# Patient Record
Sex: FEMALE | Race: BLACK OR AFRICAN AMERICAN | NOT HISPANIC OR LATINO | Employment: UNEMPLOYED | URBAN - METROPOLITAN AREA
[De-identification: names, ages, dates, MRNs, and addresses within clinical notes are randomized per-mention and may not be internally consistent; named-entity substitution may affect disease eponyms.]

---

## 2017-07-05 VITALS
TEMPERATURE: 98 F | SYSTOLIC BLOOD PRESSURE: 118 MMHG | OXYGEN SATURATION: 100 % | HEART RATE: 81 BPM | WEIGHT: 130 LBS | BODY MASS INDEX: 25.39 KG/M2 | DIASTOLIC BLOOD PRESSURE: 61 MMHG | RESPIRATION RATE: 18 BRPM

## 2017-07-05 PROCEDURE — 99499 UNLISTED E&M SERVICE: CPT | Mod: ,,, | Performed by: EMERGENCY MEDICINE

## 2017-07-05 PROCEDURE — 99900041 HC LEFT WITHOUT BEING SEEN- EMERGENCY

## 2017-07-06 ENCOUNTER — HOSPITAL ENCOUNTER (EMERGENCY)
Facility: HOSPITAL | Age: 27
Discharge: HOME | End: 2017-07-06
Payer: MEDICAID

## 2017-07-06 DIAGNOSIS — Z53.21 PATIENT LEFT WITHOUT BEING SEEN: ICD-10-CM

## 2018-01-04 ENCOUNTER — HOSPITAL ENCOUNTER (EMERGENCY)
Facility: HOSPITAL | Age: 28
Discharge: HOME | End: 2018-01-04
Payer: MEDICAID

## 2018-01-04 VITALS
WEIGHT: 136 LBS | TEMPERATURE: 98 F | HEIGHT: 60 IN | RESPIRATION RATE: 20 BRPM | HEART RATE: 78 BPM | BODY MASS INDEX: 26.7 KG/M2 | SYSTOLIC BLOOD PRESSURE: 117 MMHG | OXYGEN SATURATION: 97 % | DIASTOLIC BLOOD PRESSURE: 81 MMHG

## 2018-01-04 DIAGNOSIS — J06.9 UPPER RESPIRATORY TRACT INFECTION, UNSPECIFIED TYPE: Primary | ICD-10-CM

## 2018-01-04 PROCEDURE — 99283 EMERGENCY DEPT VISIT LOW MDM: CPT

## 2018-01-04 RX ORDER — CETIRIZINE HYDROCHLORIDE 10 MG/1
10 TABLET ORAL DAILY
Qty: 30 TABLET | Refills: 0 | Status: SHIPPED | OUTPATIENT
Start: 2018-01-04 | End: 2021-04-06

## 2018-01-04 RX ORDER — DEXTROMETHORPHAN POLISTIREX 30 MG/5ML
60 SUSPENSION ORAL 2 TIMES DAILY
Qty: 150 ML | Refills: 0 | Status: SHIPPED | OUTPATIENT
Start: 2018-01-04 | End: 2018-01-14

## 2018-01-04 NOTE — ED PROVIDER NOTES
Encounter Date: 1/4/2018       History     Chief Complaint   Patient presents with    Cough     cough x 4 days; requesting cough suppressant; recently dx and tx for URI      27-year-old female presents to the emergency room complaining of cough ×4 days.  States she's been seen at 2 other hospitals and was diagnosed with upper respiratory infection.  Was given Tylenol with Codeine which has not completely relieve her cough.  Denies any fever.  Reports occasional sore throat.  Denies any ear pain.  Also reports a history of asthma.  Denies any shortness of breath.          Review of patient's allergies indicates:  No Known Allergies  Past Medical History:   Diagnosis Date    Asthma     Hiatal hernia      History reviewed. No pertinent surgical history.  History reviewed. No pertinent family history.  Social History   Substance Use Topics    Smoking status: Never Smoker    Smokeless tobacco: Never Used    Alcohol use No     Review of Systems   Constitutional: Negative for fatigue and fever.   HENT: Positive for congestion, rhinorrhea and sore throat.    Respiratory: Positive for cough. Negative for shortness of breath.    Cardiovascular: Negative for chest pain.   Gastrointestinal: Negative for nausea.   Genitourinary: Negative for dysuria.   Musculoskeletal: Negative for back pain.   Skin: Negative for rash.   Neurological: Positive for headaches. Negative for weakness.   Hematological: Does not bruise/bleed easily.   All other systems reviewed and are negative.      Physical Exam     Initial Vitals [01/04/18 1223]   BP Pulse Resp Temp SpO2   117/81 78 20 98.3 °F (36.8 °C) 97 %      MAP       93         Physical Exam    Nursing note and vitals reviewed.  Constitutional: She appears well-developed and well-nourished. No distress.   HENT:   Head: Normocephalic.   Eyes: Conjunctivae are normal.   Neck: Normal range of motion. Neck supple.   Cardiovascular: Normal rate.   Pulmonary/Chest: Breath sounds normal. No  respiratory distress.   Abdominal: Soft. Bowel sounds are normal. She exhibits no distension and no abdominal bruit. There is no tenderness. No hernia.   Neurological: She is alert and oriented to person, place, and time.   Skin: Skin is warm and dry. Capillary refill takes less than 2 seconds.   Psychiatric: She has a normal mood and affect. Her behavior is normal. Judgment and thought content normal.         ED Course   Procedures  Labs Reviewed - No data to display          Medical Decision Making:   Initial Assessment:   27-year-old female presents to the emergency room complaining of cough ×4 days.  States she's been seen at 2 other hospitals and was diagnosed with upper respiratory infection.  Was given Tylenol with Codeine which has not completely relieve her cough.  Denies any fever.  Reports occasional sore throat.  Denies any ear pain.  Also reports a history of asthma.  Denies any shortness of breath.  Exam is grossly unremarkable.  Lungs clear bilaterally.  Patient has a nonproductive cough on exam.  Vital signs stable.  Differential Diagnosis:   URI, viral syndrome, RSV, pneumonia, bronchitis, croup, GERD, influenza, strep throat  ED Management:  I discussed with patient the cough may persist for the duration of viral illness.  I instructed the patient to do saltwater gargles to help with the sore throat and the mornings.  I'll prescribe patient medication for cough I will also prescribe a antihistamine.  Instructed to take the medications that as directed.  Follow-up with primary care doctor.                   ED Course      Clinical Impression:   The encounter diagnosis was Upper respiratory tract infection, unspecified type.                           Laura Cui NP  01/04/18 1239       Jossue Gutierrez MD  01/09/18 0633

## 2019-07-05 ENCOUNTER — HOSPITAL ENCOUNTER (EMERGENCY)
Facility: HOSPITAL | Age: 29
Discharge: HOME | End: 2019-07-05
Attending: EMERGENCY MEDICINE
Payer: COMMERCIAL

## 2019-07-05 VITALS
SYSTOLIC BLOOD PRESSURE: 117 MMHG | DIASTOLIC BLOOD PRESSURE: 78 MMHG | HEIGHT: 60 IN | OXYGEN SATURATION: 96 % | HEART RATE: 73 BPM | TEMPERATURE: 98 F | RESPIRATION RATE: 15 BRPM | BODY MASS INDEX: 26.31 KG/M2 | WEIGHT: 134 LBS

## 2019-07-05 DIAGNOSIS — M89.8X5 PAIN OF LEFT FEMUR: ICD-10-CM
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
## 2019-07-05 PROCEDURE — 99284 PR EMERGENCY DEPT VISIT,LEVEL IV: ICD-10-PCS | Mod: ,,, | Performed by: PHYSICIAN ASSISTANT

## 2019-07-05 PROCEDURE — 25000003 PHARM REV CODE 250: Performed by: PHYSICIAN ASSISTANT

## 2019-07-05 PROCEDURE — 99284 EMERGENCY DEPT VISIT MOD MDM: CPT

## 2019-07-05 PROCEDURE — 99284 EMERGENCY DEPT VISIT MOD MDM: CPT | Mod: ,,, | Performed by: PHYSICIAN ASSISTANT

## 2019-07-05 RX ORDER — METHOCARBAMOL 750 MG/1
1500 TABLET, FILM COATED ORAL 3 TIMES DAILY
Qty: 30 TABLET | Refills: 0 | Status: SHIPPED | OUTPATIENT
Start: 2019-07-05 | End: 2019-07-10

## 2019-07-05 RX ORDER — NAPROXEN 500 MG/1
500 TABLET ORAL 2 TIMES DAILY WITH MEALS
Qty: 20 TABLET | Refills: 0 | Status: SHIPPED | OUTPATIENT
Start: 2019-07-05 | End: 2021-04-06

## 2019-07-05 RX ORDER — ACETAMINOPHEN 500 MG
1000 TABLET ORAL
Status: COMPLETED | OUTPATIENT
Start: 2019-07-05 | End: 2019-07-05

## 2019-07-05 RX ORDER — IBUPROFEN 400 MG/1
800 TABLET ORAL
Status: COMPLETED | OUTPATIENT
Start: 2019-07-05 | End: 2019-07-05

## 2019-07-05 RX ADMIN — ACETAMINOPHEN 1000 MG: 500 TABLET ORAL at 04:07

## 2019-07-05 RX ADMIN — IBUPROFEN 800 MG: 400 TABLET, FILM COATED ORAL at 04:07
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
## 2019-07-05 NOTE — DISCHARGE INSTRUCTIONS
Use naproxen and robaxin as needed as directed  Followup with primary care and return to the ED as needed    Our goal in the emergency department is to always give you outstanding care and exceptional service. You may receive a survey by mail or e-mail in the next week regarding your experience in our ED. We would greatly appreciate your completing and returning the survey. Your feedback provides us with a way to recognize our staff who give very good care and it helps us learn how to improve when your experience was below our aspiration of excellence.

## 2020-04-12 ENCOUNTER — OFFICE VISIT (OUTPATIENT)
Dept: PRIMARY CARE CLINIC | Facility: CLINIC | Age: 30
End: 2020-04-12
Payer: MEDICAID

## 2020-04-12 DIAGNOSIS — J45.20 INTERMITTENT ASTHMA, UNSPECIFIED ASTHMA SEVERITY, UNSPECIFIED WHETHER COMPLICATED: Primary | ICD-10-CM

## 2020-04-12 PROCEDURE — 99213 PR OFFICE/OUTPT VISIT, EST, LEVL III, 20-29 MIN: ICD-10-PCS | Mod: 95,,, | Performed by: INTERNAL MEDICINE

## 2020-04-12 PROCEDURE — 99213 OFFICE O/P EST LOW 20 MIN: CPT | Mod: 95,,, | Performed by: INTERNAL MEDICINE

## 2020-04-12 RX ORDER — ALBUTEROL SULFATE 1.25 MG/3ML
1.25 SOLUTION RESPIRATORY (INHALATION) EVERY 6 HOURS PRN
Qty: 1 BOX | Refills: 5 | Status: SHIPPED | OUTPATIENT
Start: 2020-04-12 | End: 2021-04-12

## 2020-04-12 RX ORDER — ALBUTEROL SULFATE 90 UG/1
2 AEROSOL, METERED RESPIRATORY (INHALATION) EVERY 4 HOURS PRN
Qty: 18 G | Refills: 5 | Status: SHIPPED | OUTPATIENT
Start: 2020-04-12

## 2020-04-12 NOTE — LETTER
April 12, 2020      [facility]  [address]  [facility] [address]  Phone: [phone]  Fax: [phone]       Patient: Jennyfer Bobby   YOB: 1990  Date of Visit: 04/12/2020    To Whom It May Concern:    Bandar Bobby  was at [facility] on 04/12/2020. She may return to work/school on 04/13/2020 with no restrictions. If you have any questions or concerns, or if I can be of further assistance, please do not hesitate to contact me.    Sincerely,    Annika Marroquin

## 2020-04-12 NOTE — PROGRESS NOTES
Subjective:   The patient location is: home  The chief complaint leading to consultation is: asthma refill  Visit type: Virtual visit with synchronous audio and video  Total time spent with patient: 10 minutes  Each patient to whom he or she provides medical services by telemedicine is:  (1) informed of the relationship between the physician and patient and the respective role of any other health care provider with respect to management of the patient; and (2) notified that he or she may decline to receive medical services by telemedicine and may withdraw from such care at any time.    Notes:    Patient ID: Jennyfer Bobby is a 29 y.o. female.  Patient was seen by telemedicine physical exam limited blood pressure and temperature not available and after working with the MA for 0.5 hr patient has technical difficulty to get on virtual visit only can do audio visit  Chief Complaint: Asthma and Sinusitis    HPI  patient visit today is mainly to refill medication for asthma she is on both medication through the nebulizer and albuterol metered-dose inhaler she currently deny any fever coughing short of breath or chest pain or any symptoms she is not on any long-acting inhaler and she has not been seen in the clinic for more than 3 years she does not want any maintenance inhaler at the present time will do when she can come to the clinic for office follow-up she denies smoking drinking she deny pregnancy she deny taking any other medication  Review of Systems    Objective:      Physical Exam    Assessment:       1. Intermittent asthma, unspecified asthma severity, unspecified whether complicated        Plan:       Intermittent asthma, unspecified asthma severity, unspecified whether complicated  -     albuterol (PROVENTIL/VENTOLIN HFA) 90 mcg/actuation inhaler; Inhale 2 puffs into the lungs every 4 (four) hours as needed for Wheezing or Shortness of Breath. Rescue  Dispense: 18 g; Refill: 5  -     albuterol (ACCUNEB) 1.25  mg/3 mL Nebu; Take 3 mLs (1.25 mg total) by nebulization every 6 (six) hours as needed. Rescue  Dispense: 1 Box; Refill: 5

## 2020-04-12 NOTE — LETTER
April 12, 2020      [facility]  [address]  [facility] [address]  Phone: [phone]  Fax: [phone]       Patient: Jennyfer Bobby   YOB: 1990  Date of Visit: 04/12/2020    To Whom It May Concern:    Bandar Bobby  was at [facility] on 04/12/2020. She may return to work/school on 04/18/2020 with no restrictions. If you have any questions or concerns, or if I can be of further assistance, please do not hesitate to contact me.    Sincerely,    Annika Marroquin

## 2020-07-21 ENCOUNTER — OFFICE VISIT (OUTPATIENT)
Dept: PRIMARY CARE CLINIC | Facility: CLINIC | Age: 30
End: 2020-07-21

## 2020-07-21 ENCOUNTER — TELEPHONE (OUTPATIENT)
Dept: PRIMARY CARE CLINIC | Facility: CLINIC | Age: 30
End: 2020-07-21

## 2020-07-21 VITALS
WEIGHT: 115.88 LBS | SYSTOLIC BLOOD PRESSURE: 110 MMHG | BODY MASS INDEX: 22.75 KG/M2 | HEIGHT: 60 IN | TEMPERATURE: 99 F | HEART RATE: 77 BPM | OXYGEN SATURATION: 99 % | DIASTOLIC BLOOD PRESSURE: 82 MMHG | RESPIRATION RATE: 18 BRPM

## 2020-07-21 DIAGNOSIS — S43.411D SPRAIN OF RIGHT CORACOHUMERAL LIGAMENT, SUBSEQUENT ENCOUNTER: Primary | ICD-10-CM

## 2020-07-21 DIAGNOSIS — M25.511 ACUTE PAIN OF RIGHT SHOULDER: ICD-10-CM

## 2020-07-21 PROCEDURE — 99999 PR PBB SHADOW E&M-EST. PATIENT-LVL IV: CPT | Mod: PBBFAC,,, | Performed by: INTERNAL MEDICINE

## 2020-07-21 PROCEDURE — 99999 PR PBB SHADOW E&M-EST. PATIENT-LVL IV: ICD-10-PCS | Mod: PBBFAC,,, | Performed by: INTERNAL MEDICINE

## 2020-07-21 PROCEDURE — 99213 OFFICE O/P EST LOW 20 MIN: CPT | Mod: S$GLB,,, | Performed by: INTERNAL MEDICINE

## 2020-07-21 PROCEDURE — 99213 PR OFFICE/OUTPT VISIT, EST, LEVL III, 20-29 MIN: ICD-10-PCS | Mod: S$GLB,,, | Performed by: INTERNAL MEDICINE

## 2020-07-21 RX ORDER — TRAMADOL HYDROCHLORIDE 50 MG/1
50 TABLET ORAL EVERY 8 HOURS PRN
Qty: 30 TABLET | Refills: 0 | Status: SHIPPED | OUTPATIENT
Start: 2020-07-21 | End: 2020-07-21 | Stop reason: SDUPTHER

## 2020-07-21 RX ORDER — METHYLPREDNISOLONE 4 MG/1
TABLET ORAL
Qty: 1 PACKAGE | Refills: 0 | Status: SHIPPED | OUTPATIENT
Start: 2020-07-21 | End: 2020-07-21 | Stop reason: SDUPTHER

## 2020-07-21 RX ORDER — TRAMADOL HYDROCHLORIDE 50 MG/1
50 TABLET ORAL EVERY 8 HOURS PRN
Qty: 30 TABLET | Refills: 0 | Status: SHIPPED | OUTPATIENT
Start: 2020-07-21 | End: 2020-08-31

## 2020-07-21 RX ORDER — METHYLPREDNISOLONE 4 MG/1
TABLET ORAL
Qty: 1 PACKAGE | Refills: 0 | Status: SHIPPED | OUTPATIENT
Start: 2020-07-21 | End: 2020-08-31

## 2020-07-21 NOTE — PROGRESS NOTES
Subjective:       Patient ID: Jennyfer Bobby is a 30 y.o. female.    Chief Complaint: Shoulder Pain (right shoulder pain from work injury)    HPI  patient states that she is here for follow-up for her right shoulder injury that she had 2 weeks ago at work she work as  has to reach and sometime pushing lifting some heavy stuffs 2 weeks ago while doing her job she experienced severe pain in her right shoulder she thought that is probably just the pulled muscle and did not seek any treatment until the next day his the pain get worse and she cannot lift her shoulder without pain she went to [facility] emergency [facility] for evaluation see had x-ray of the shoulder she was toe no fracture or dislocation but her symptoms persist a not improving with medication muscle relaxant from the ER she deny any history of shoulder injury or problem in the past she currently not pregnant  Review of Systems    Objective:      Physical Exam  Vitals signs and nursing note reviewed.   Constitutional:       General: She is not in acute distress.     Appearance: She is well-developed.   HENT:      Head: Normocephalic and atraumatic.      Right Ear: External ear normal.      Left Ear: External ear normal.      Nose: Nose normal.      Mouth/Throat:      Pharynx: No oropharyngeal exudate.   Eyes:      Conjunctiva/sclera: Conjunctivae normal.      Pupils: Pupils are equal, round, and reactive to light.   Neck:      Musculoskeletal: Normal range of motion and neck supple.      Thyroid: No thyromegaly.   Cardiovascular:      Rate and Rhythm: Normal rate and regular rhythm.      Heart sounds: Normal heart sounds. No murmur. No friction rub. No gallop.    Pulmonary:      Effort: Pulmonary effort is normal. No respiratory distress.      Breath sounds: Normal breath sounds. No wheezing or rales.   Abdominal:      General: Bowel sounds are normal. There is no distension.      Palpations: Abdomen is soft.      Tenderness: There is no abdominal  tenderness.   Musculoskeletal: Normal range of motion.         General: Tenderness (On shoulder exam patient complained of pain tenderness in the clinical humeral joint area with any passive range of motion in all directions) present. No deformity.   Lymphadenopathy:      Cervical: No cervical adenopathy.   Skin:     General: Skin is warm and dry.      Capillary Refill: Capillary refill takes less than 2 seconds.      Findings: No erythema or rash.   Neurological:      Mental Status: She is alert and oriented to person, place, and time.   Psychiatric:         Thought Content: Thought content normal.         Judgment: Judgment normal.         Assessment:       1. Sprain of right coracohumeral ligament, subsequent encounter    2. Acute pain of right shoulder        Plan:       Sprain of right coracohumeral ligament, subsequent encounter  Comments:  Patient still in a lot of pain not ready to return to work yet may need orthopedic consult  Orders:  -     Discontinue: methylPREDNISolone (MEDROL DOSEPACK) 4 mg tablet; use as directed  Dispense: 1 Package; Refill: 0  -     Discontinue: traMADoL (ULTRAM) 50 mg tablet; Take 1 tablet (50 mg total) by mouth every 8 (eight) hours as needed for Pain.  Dispense: 30 tablet; Refill: 0  -     Ambulatory referral/consult to Physical/Occupational Therapy; Future; Expected date: 07/21/2020  -     methylPREDNISolone (MEDROL DOSEPACK) 4 mg tablet; use as directed  Dispense: 1 Package; Refill: 0  -     traMADoL (ULTRAM) 50 mg tablet; Take 1 tablet (50 mg total) by mouth every 8 (eight) hours as needed for Pain.  Dispense: 30 tablet; Refill: 0    Acute pain of right shoulder  Comments:  Patient is not getting better with medical treatment still in severe pain will get MRI to evaluate continue physical therapy may need  orthopedic consult  Orders:  -     MRI Shoulder Without Contrast Right; Future; Expected date: 07/21/2020

## 2020-07-21 NOTE — TELEPHONE ENCOUNTER
----- Message from Jennifer Ford sent at 7/21/2020 10:48 AM CDT -----  Contact: [facility] [phone]  Pharmacy is calling to clarify an RX.  RX name:  traMADoL (ULTRAM) 50 mg tablet  What do they need to clarify:  chronic or acute  condition; documentation needed for more than 7 day supply  Comments:

## 2020-07-21 NOTE — LETTER
July 21, 2020      [facility]  [address]  [facility] [address]  Phone: [phone]  Fax: [phone]       Patient: Jennyfer Bobby   YOB: 1990  Date of Visit: 07/21/2020    To Whom It May Concern:    Bandar Bobby  was at [facility] on 07/21/2020. She is currently under physician care until she is medically cleared by PCP and Orthopedist.She may return to work on 07/22/2020 with light duty restrictions. Patients is to lift no more than 5 pounds.  If you have any questions or concerns, or if I can be of further assistance, please do not hesitate to contact me.    Sincerely,    Dr. Surjit Silva MD

## 2020-07-22 ENCOUNTER — CLINICAL SUPPORT (OUTPATIENT)
Dept: REHABILITATION | Facility: OTHER | Age: 30
End: 2020-07-22
Payer: MEDICAID

## 2020-07-22 DIAGNOSIS — M25.511 ACUTE PAIN OF RIGHT SHOULDER: ICD-10-CM

## 2020-07-22 DIAGNOSIS — S43.411D SPRAIN OF RIGHT CORACOHUMERAL LIGAMENT, SUBSEQUENT ENCOUNTER: ICD-10-CM

## 2020-07-22 DIAGNOSIS — M62.81 MUSCLE WEAKNESS OF RIGHT UPPER EXTREMITY: ICD-10-CM

## 2020-07-22 DIAGNOSIS — M25.611 DECREASED RANGE OF MOTION OF RIGHT SHOULDER: ICD-10-CM

## 2020-07-22 PROCEDURE — 97162 PT EVAL MOD COMPLEX 30 MIN: CPT | Mod: PN

## 2020-07-22 PROCEDURE — 97110 THERAPEUTIC EXERCISES: CPT | Mod: PN

## 2020-07-22 NOTE — PATIENT INSTRUCTIONS
PENDULUM FORWARD BACK - CODMAN          Shift your body weight forward then back to allow your injured arm to swing forward and back freely. Your injured arm should be fully relaxed.    Perform 20 reps, 2-3 times a day.    Copyright © 4111-1431 [employer]    PENDULUM CIRCLES - CODMAN          Shift your body weight in circles to allow your injured arm to swing in circles freely. Your injured arm should be fully relaxed.    Perform 20 reps, 2-3 times a day.    Copyright © 0005-1268 [employer]    PENDULUM LATERAL - CODMAN        Shift your body weight side to side to allow your injured arm to swing side to side freely. Your injured arm should be fully relaxed.    Perform 20 reps, 2-3 times a day.    Copyright © 7831-6687 [employer]    SCAPULAR RETRACTIONS        Draw your shoulder blades back and down.    Hold for 5 seconds. Perform 20 reps, 2-3 times a day.    Copyright © 4442-0345 [employer]
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
## 2020-07-27 ENCOUNTER — CLINICAL SUPPORT (OUTPATIENT)
Dept: REHABILITATION | Facility: OTHER | Age: 30
End: 2020-07-27
Payer: MEDICAID

## 2020-07-27 DIAGNOSIS — M62.81 MUSCLE WEAKNESS OF RIGHT UPPER EXTREMITY: ICD-10-CM

## 2020-07-27 DIAGNOSIS — M25.511 ACUTE PAIN OF RIGHT SHOULDER: ICD-10-CM

## 2020-07-27 DIAGNOSIS — M25.611 DECREASED RANGE OF MOTION OF RIGHT SHOULDER: ICD-10-CM

## 2020-07-27 PROCEDURE — 97110 THERAPEUTIC EXERCISES: CPT | Mod: PN,CQ

## 2020-07-27 NOTE — PROGRESS NOTES
Physical Therapy Treatment Note     Name: Jennyfer Bobby  Clinic Number: [id number]    Therapy Diagnosis: No diagnosis found.  Physician: Surjit Silva MD    Visit Date: 7/27/2020    Physician Orders: PT Eval and Treat   Medical Diagnosis: S43.411D (ICD-10-CM) - Sprain of right coracohumeral ligament  Evaluation Date: 7/22/2020  Authorization Period Expiration: 7/31/2020  Plan of Care Certification Period: 9/16/2020  Visit # / Visits authorized: 2 (1/10)     Time In: 0930  Time Out: 1015  Total Billable Time:45  minutes     Precautions: Standard       Subjective     Pt reports: w/ no c/o pn in R shld currently but does experience slight pn and stiffness.  She was compliant with home exercise program.  Response to previous treatment: no adverse effects  Functional change: no change     Pain: 0/10  Location: right shoulder      Objective     Jennyfer received therapeutic exercises to develop strength, endurance, ROM, flexibility, posture and core stabilization for 45 minutes including:  Scap retractions 30 x 5 sec hold   Pendulums 4 way 2 min ea   Table slides 20 x 5 sec hold   Pulley abduction  3 min   Isometric flex, ext IR ER abd  20 x ea     Home Exercises Provided and Patient Education Provided     Education provided:   - Pt edu on proper exercise technique.      Written Home Exercises Provided: Patient instructed to cont prior HEP.  Exercises were reviewed and Jennyfer was able to demonstrate them prior to the end of the session.  Jennyfer demonstrated good  understanding of the education provided.     See EMR under Patient Instructions for exercises provided prior visit.    Assessment     Pt karel tx well.  Pn level remained same prior to and after tx session.  Pt demonstrated good effort during therex.  Pt cont to have limited ROM and scap strength.    Jennyfer is progressing well towards her goals.   Pt prognosis is Good.     Pt will continue to benefit from skilled outpatient physical therapy to address the  deficits listed in the problem list box on initial evaluation, provide pt/family education and to maximize pt's level of independence in the home and community environment.     Pt's spiritual, cultural and educational needs considered and pt agreeable to plan of care and goals.     Anticipated barriers to physical therapy: none    Goals: Goals:  Short Term Goals: 4 weeks   1. Independent with HEP. ( progressing, not met)   2. Report decreased R UE pain < or =  7/10 with adls such as  lifting, sleeping on right side, combing her hair.( progressing, not met)   3. Increased MMT for B UE by 1 muscle grade to promote proper scapular stability to decrease R UE pain < or =  7/10 with adls such as  lifting, sleeping on right side, combing her hair. ( progressing, not met)   4. Increased PROM R shoulder flex to 165 deg, R shoulder PROM to 165 deg( progressing, not met)         Long Term Goals: 8 weeks   5. Increased R shoulder AROM flex to 165 deg, R shoulder AROM abd to 165 deg( progressing, not met)   6. Report decreased R UE pain < or =  5/10 with adls such as  lifting, sleeping on right side, combing her hair( progressing, not met)   7. Increased MMT for B UE to 4/5 muscle grade to promote proper scapular stability to decrease R UE pain < or =  5/10 with adls such as  lifting, sleeping on right side, combing her hair ( progressing, not met)   8. Patient to achieve CJ (at least 20% < 40% impaired, limited or restricted) level on the FOTO Outcomes Measurement System.( progressing, not met)          Plan     Cont to progress towards goals set by PT.  Work to increase scap strength and shld ROM next visit.      Celso Pederson, PTA

## 2020-07-28 ENCOUNTER — TELEPHONE (OUTPATIENT)
Dept: URGENT CARE | Facility: CLINIC | Age: 30
End: 2020-07-28

## 2020-07-28 NOTE — TELEPHONE ENCOUNTER
Unable to contact patient at cell number nor home number regarding follow up with Occupational health for her work comp injury.  Her follow up scheduled with Dr. Silva has been cancelled as he does not see workers compensation injuries.

## 2020-07-30 ENCOUNTER — OFFICE VISIT (OUTPATIENT)
Dept: URGENT CARE | Facility: CLINIC | Age: 30
End: 2020-07-30
Payer: OTHER MISCELLANEOUS

## 2020-07-30 VITALS
WEIGHT: 115 LBS | HEIGHT: 60 IN | SYSTOLIC BLOOD PRESSURE: 110 MMHG | TEMPERATURE: 99 F | HEART RATE: 78 BPM | OXYGEN SATURATION: 98 % | BODY MASS INDEX: 22.58 KG/M2 | DIASTOLIC BLOOD PRESSURE: 86 MMHG

## 2020-07-30 DIAGNOSIS — M75.81 TENDINITIS OF RIGHT ROTATOR CUFF: ICD-10-CM
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
## 2020-07-30 DIAGNOSIS — M25.611 DECREASED RANGE OF MOTION OF RIGHT SHOULDER: ICD-10-CM

## 2020-07-30 DIAGNOSIS — M62.81 MUSCLE WEAKNESS OF RIGHT UPPER EXTREMITY: ICD-10-CM

## 2020-07-30 PROCEDURE — 99203 OFFICE O/P NEW LOW 30 MIN: CPT | Mod: S$GLB,,, | Performed by: NURSE PRACTITIONER

## 2020-07-30 PROCEDURE — 99203 PR OFFICE/OUTPT VISIT, NEW, LEVL III, 30-44 MIN: ICD-10-PCS | Mod: S$GLB,,, | Performed by: NURSE PRACTITIONER

## 2020-07-30 RX ORDER — METHOCARBAMOL 750 MG/1
750 TABLET, FILM COATED ORAL 4 TIMES DAILY PRN
Qty: 46 TABLET | Refills: 0 | Status: SHIPPED | OUTPATIENT
Start: 2020-07-30 | End: 2020-08-13 | Stop reason: SDUPTHER

## 2020-07-30 RX ORDER — DICLOFENAC SODIUM 50 MG/1
50 TABLET, DELAYED RELEASE ORAL 2 TIMES DAILY PRN
Qty: 30 TABLET | Refills: 0 | Status: SHIPPED | OUTPATIENT
Start: 2020-07-30 | End: 2020-08-13 | Stop reason: SDUPTHER

## 2020-07-30 NOTE — PROGRESS NOTES
Subjective:       Patient ID: Jennyfer Bobby is a 30 y.o. female.    Chief Complaint: Work Related Injury    Patient is here for WC visit. She's had xrays, an MRI, and two physical therapy sessions. States she injured her shoulder while at work on 06/20/2020 after pulling on something heavy. Felt it pop.    Shoulder Pain   The pain is present in the right shoulder. This is a new problem. The current episode started more than 1 month ago. There has been a history of trauma. The problem occurs constantly. The problem has been unchanged. The quality of the pain is described as aching. The pain is at a severity of 6/10. Associated symptoms include a limited range of motion and stiffness. Pertinent negatives include no fever or headaches. The symptoms are aggravated by activity. She has tried acetaminophen, NSAIDS, oral narcotics and movement for the symptoms. The treatment provided mild relief.       Constitution: Negative for chills, fatigue and fever.   HENT: Negative for congestion and sore throat.    Neck: Negative for painful lymph nodes.   Cardiovascular: Negative for chest pain and leg swelling.   Eyes: Negative for double vision and blurred vision.   Respiratory: Negative for cough and shortness of breath.    Gastrointestinal: Negative for nausea, vomiting and diarrhea.   Genitourinary: Negative for dysuria, frequency, urgency and history of kidney stones.   Musculoskeletal: Positive for joint pain and abnormal ROM of joint. Negative for joint swelling, muscle cramps and muscle ache.   Skin: Negative for color change, pale, rash, erythema and bruising.   Allergic/Immunologic: Negative for seasonal allergies.   Neurological: Negative for dizziness, history of vertigo, light-headedness, passing out and headaches.   Hematologic/Lymphatic: Negative for swollen lymph nodes.   Psychiatric/Behavioral: Negative for nervous/anxious, sleep disturbance and depression. The patient is not nervous/anxious.          Objective:      Physical Exam  Vitals signs and nursing note reviewed.   Constitutional:       Appearance: She is well-developed.   HENT:      Head: Normocephalic and atraumatic. No abrasion, contusion or laceration.      Right Ear: External ear normal.      Left Ear: External ear normal.      Nose: Nose normal.   Eyes:      General: Lids are normal.      Conjunctiva/sclera: Conjunctivae normal.      Pupils: Pupils are equal, round, and reactive to light.   Neck:      Musculoskeletal: Full passive range of motion without pain and neck supple.      Trachea: Trachea and phonation normal.   Cardiovascular:      Rate and Rhythm: Normal rate.      Pulses: Normal pulses.           Radial pulses are 2+ on the right side and 2+ on the left side.   Pulmonary:      Effort: Pulmonary effort is normal. No respiratory distress.      Breath sounds: No stridor.   Musculoskeletal:         General: Tenderness present.      Right shoulder: She exhibits decreased range of motion, tenderness, pain and decreased strength.        Arms:    Skin:     General: Skin is warm and dry.      Capillary Refill: Capillary refill takes less than 2 seconds.      Findings: No abrasion, bruising, burn, ecchymosis, erythema, laceration, lesion or rash.   Neurological:      Mental Status: She is alert and oriented to person, place, and time.   Psychiatric:         Speech: Speech normal.         Behavior: Behavior normal.         Thought Content: Thought content normal.         Judgment: Judgment normal.       Mri Shoulder Without Contrast Right    Result Date: 7/24/2020  EXAMINATION: MRI SHOULDER WITHOUT CONTRAST RIGHT CLINICAL HISTORY: Shoulder pain, labral tear suspected, nondiagnostic xray;  Pain in right shoulder TECHNIQUE: Routine multisequence multiplanar MRI right shoulder obtained. COMPARISON: None. FINDINGS: There is mild increased signal/thickening of the rotator cuff, involving the insertional fibers of the supra and infraspinatus tendons.   No discrete tear/retraction.  The teres minor and subscapularis tendons are intact. The muscle bulk is maintained.  The glenohumeral joint alignment is within normal limits. Mild subdeltoid fluid noted. The biceps tendon demonstrate normal size, signal, and location. The acromion is slightly curved, type 2 with mild anterior beaking. The labrum is grossly intact, noting limited assessment without intra-articular contrast.  No paralabral cyst. No cartilage defects or subchondral marrow changes.  No joint effusion. No fracture or marrow replacement.  No axillary lymphadenopathy.     Mild rotator cuff tendinosis, involving the supra and infraspinatus tendons.  No discrete tear/retraction. Mild subdeltoid fluid, suggestive of bursitis. The labrum is grossly intact, noting limited assessment without intra-articular contrast.  No paralabral cyst. Electronically signed by: Coleman López MD Date:    07/24/2020 Time:    08:58  Assessment:       1. Work related injury    2. Tendinitis of right rotator cuff    3. Decreased range of motion of right shoulder    4. Muscle weakness of right upper extremity        Plan:         Medications Ordered This Encounter   Medications    diclofenac (VOLTAREN) 50 MG EC tablet     Sig: Take 1 tablet (50 mg total) by mouth 2 (two) times daily as needed (pain).     Dispense:  30 tablet     Refill:  0    methocarbamoL (ROBAXIN) 750 MG Tab     Sig: Take 1 tablet (750 mg total) by mouth 4 (four) times daily as needed.     Dispense:  46 tablet     Refill:  0     Patient Instructions: Daily home exercises/warm soaks, Continue Physical Therapy   Restrictions: No lifting/pushing/pulling more than 10 lbs, Limited use of right hand and arm, No above the shoulder/overhead work  Follow up in about 2 weeks (around 8/13/2020).

## 2020-07-30 NOTE — LETTER
[facility] [address] [address]  Phone: [phone]  Fax: [phone]  [facility]: [email or web address]    Pt Name: Jennyfer Bobby  Injury Date: 07/02/2020    Date of First Treatment: 07/30/2020   Company: [employer]      Appointment Time: 08:15 AM Arrived: 830am   Provider: Joseline Shearer NP Time Out:940am     Office Treatment:   1. Work related injury    2. Tendinitis of right rotator cuff    3. Decreased range of motion of right shoulder    4. Muscle weakness of right upper extremity      Medications Ordered This Encounter   Medications    diclofenac (VOLTAREN) 50 MG EC tablet    methocarbamoL (ROBAXIN) 750 MG Tab      Patient Instructions: Daily home exercises/warm soaks, Continue Physical Therapy    Restrictions: No lifting/pushing/pulling more than 10 lbs, Limited use of right hand and arm, No above the shoulder/overhead work     Return Appointment: 08/13/2020 at 10am

## 2020-08-06 ENCOUNTER — CLINICAL SUPPORT (OUTPATIENT)
Dept: REHABILITATION | Facility: OTHER | Age: 30
End: 2020-08-06
Payer: MEDICAID

## 2020-08-06 DIAGNOSIS — M62.81 MUSCLE WEAKNESS OF RIGHT UPPER EXTREMITY: ICD-10-CM

## 2020-08-06 DIAGNOSIS — M25.611 DECREASED RANGE OF MOTION OF RIGHT SHOULDER: ICD-10-CM

## 2020-08-06 DIAGNOSIS — M25.511 ACUTE PAIN OF RIGHT SHOULDER: ICD-10-CM

## 2020-08-06 PROCEDURE — 97110 THERAPEUTIC EXERCISES: CPT | Mod: PN,CQ

## 2020-08-06 NOTE — PROGRESS NOTES
Physical Therapy Treatment Note     Name: Jennyfer Bobby  Clinic Number: [id number]    Therapy Diagnosis:   Encounter Diagnoses   Name Primary?    Acute pain of right shoulder     Muscle weakness of right upper extremity     Decreased range of motion of right shoulder      Physician: Surjit Silva MD    Visit Date: 8/6/2020    Physician Orders: PT Eval and Treat   Medical Diagnosis: S43.411D (ICD-10-CM) - Sprain of right coracohumeral ligament  Evaluation Date: 7/22/2020  Authorization Period Expiration: 7/31/2020  Plan of Care Certification Period: 9/16/2020  Visit # / Visits authorized: 2 (1/10)     Time In: 0950  Time Out: 1035  Total Billable Time: 45 minutes     Precautions: Standard       Subjective     Pt states feeling well w/ no c/o pn in R shld but does have a tooth ache.    She was compliant with home exercise program.  Response to previous treatment:some muscle soreness  Functional change: no change     Pain: 0/10  Location: right shoulder      Objective     Jennyfer received therapeutic exercises to develop strength, endurance, ROM, flexibility, posture and core stabilization for 45 minutes including:  Scap retractions 30 x 5 sec hold otb   ER/IR step outs otb 20 x ea   Pendulums 4 way 2 min ea   Table slides 20 x 5 sec hold   Pulley abduction  3 min   Isometric flex, ext, abd 20 x ea    Home Exercises Provided and Patient Education Provided     Education provided:   - Pt edu on proper exercise technique.      Written Home Exercises Provided: Patient instructed to cont prior HEP.  Exercises were reviewed and Jennyfer was able to demonstrate them prior to the end of the session.  Jennyfer demonstrated good  understanding of the education provided.     See EMR under Patient Instructions for exercises provided prior visit.    Assessment   Pt cont to lack some scap control but did displayed increased strength during therex.  Pt karel tx well w/ no c/o pn.      Jennyfer is progressing well towards her goals.    Pt prognosis is Good.     Pt will continue to benefit from skilled outpatient physical therapy to address the deficits listed in the problem list box on initial evaluation, provide pt/family education and to maximize pt's level of independence in the home and community environment.     Pt's spiritual, cultural and educational needs considered and pt agreeable to plan of care and goals.     Anticipated barriers to physical therapy: none    Goals: Goals:  Short Term Goals: 4 weeks   1. Independent with HEP. ( progressing, not met)   2. Report decreased R UE pain < or =  7/10 with adls such as  lifting, sleeping on right side, combing her hair.( progressing, not met)   3. Increased MMT for B UE by 1 muscle grade to promote proper scapular stability to decrease R UE pain < or =  7/10 with adls such as  lifting, sleeping on right side, combing her hair. ( progressing, not met)   4. Increased PROM R shoulder flex to 165 deg, R shoulder PROM to 165 deg( progressing, not met)         Long Term Goals: 8 weeks   5. Increased R shoulder AROM flex to 165 deg, R shoulder AROM abd to 165 deg( progressing, not met)   6. Report decreased R UE pain < or =  5/10 with adls such as  lifting, sleeping on right side, combing her hair( progressing, not met)   7. Increased MMT for B UE to 4/5 muscle grade to promote proper scapular stability to decrease R UE pain < or =  5/10 with adls such as  lifting, sleeping on right side, combing her hair ( progressing, not met)   8. Patient to achieve CJ (at least 20% < 40% impaired, limited or restricted) level on the FOTO Outcomes Measurement System.( progressing, not met)          Plan     Cont to progress towards goals set by PT.  Work to increase scap strength and shld ROM next visit.      Celso Pederson, PTA

## 2020-08-13 ENCOUNTER — CLINICAL SUPPORT (OUTPATIENT)
Dept: REHABILITATION | Facility: OTHER | Age: 30
End: 2020-08-13
Payer: MEDICAID

## 2020-08-13 ENCOUNTER — OFFICE VISIT (OUTPATIENT)
Dept: URGENT CARE | Facility: CLINIC | Age: 30
End: 2020-08-13
Payer: OTHER MISCELLANEOUS

## 2020-08-13 VITALS
WEIGHT: 115.06 LBS | BODY MASS INDEX: 22.59 KG/M2 | DIASTOLIC BLOOD PRESSURE: 68 MMHG | RESPIRATION RATE: 20 BRPM | SYSTOLIC BLOOD PRESSURE: 122 MMHG | HEART RATE: 67 BPM | HEIGHT: 60 IN | TEMPERATURE: 98 F | OXYGEN SATURATION: 97 %

## 2020-08-13 DIAGNOSIS — M75.81 TENDINITIS OF RIGHT ROTATOR CUFF: ICD-10-CM

## 2020-08-13 DIAGNOSIS — M25.611 DECREASED RANGE OF MOTION OF RIGHT SHOULDER: ICD-10-CM

## 2020-08-13 DIAGNOSIS — M25.511 ACUTE PAIN OF RIGHT SHOULDER: ICD-10-CM
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
## 2020-08-13 DIAGNOSIS — M62.81 MUSCLE WEAKNESS OF RIGHT UPPER EXTREMITY: ICD-10-CM

## 2020-08-13 PROCEDURE — 99214 OFFICE O/P EST MOD 30 MIN: CPT | Mod: S$GLB,,, | Performed by: NURSE PRACTITIONER

## 2020-08-13 PROCEDURE — 97110 THERAPEUTIC EXERCISES: CPT | Mod: PN,CQ

## 2020-08-13 PROCEDURE — 99214 PR OFFICE/OUTPT VISIT, EST, LEVL IV, 30-39 MIN: ICD-10-PCS | Mod: S$GLB,,, | Performed by: NURSE PRACTITIONER

## 2020-08-13 RX ORDER — METHOCARBAMOL 750 MG/1
750 TABLET, FILM COATED ORAL 4 TIMES DAILY PRN
Qty: 46 TABLET | Refills: 0 | Status: SHIPPED | OUTPATIENT
Start: 2020-08-13 | End: 2020-08-27

## 2020-08-13 RX ORDER — DICLOFENAC SODIUM 50 MG/1
50 TABLET, DELAYED RELEASE ORAL 2 TIMES DAILY PRN
Qty: 30 TABLET | Refills: 0 | Status: SHIPPED | OUTPATIENT
Start: 2020-08-13 | End: 2021-04-06

## 2020-08-13 NOTE — PROGRESS NOTES
Physical Therapy Treatment Note     Name: Jennyfer Bobby  Clinic Number: [id number]    Therapy Diagnosis:   Encounter Diagnoses   Name Primary?    Acute pain of right shoulder     Muscle weakness of right upper extremity     Decreased range of motion of right shoulder      Physician: Surjit Silva MD    Visit Date: 8/13/2020    Physician Orders: PT Eval and Treat   Medical Diagnosis: S43.411D (ICD-10-CM) - Sprain of right coracohumeral ligament  Evaluation Date: 7/22/2020  Authorization Period Expiration: 7/31/2020  Plan of Care Certification Period: 9/16/2020  Visit # / Visits authorized: 3/10 (1/10)     Time In: 0930  Time Out: 1015  Total Billable Time: 45 minutes     Precautions: Standard       Subjective     Pt states feeling a little sore. States she felt like she over did it at work by lifting something that was too heavy.   She was compliant with home exercise program.  Response to previous treatment:some muscle soreness  Functional change: no change     Pain: 4/10  Location: right shoulder      Objective     Jennyfer received therapeutic exercises to develop strength, endurance, ROM, flexibility, posture and core stabilization for 45 minutes including:    Scap retractions 30 x 5 sec hold otb   ER/IR step outs otb 20 x ea   Pendulums 4 way 2 min ea   Table slides 20 x 5 sec hold    Pulley abduction  3 min   Isometric flex, ext, abd 20 x ea    Home Exercises Provided and Patient Education Provided     Education provided:   - Pt edu on proper exercise technique.      Written Home Exercises Provided: Patient instructed to cont prior HEP.  Exercises were reviewed and Jennyfer was able to demonstrate them prior to the end of the session.  Jennyfer demonstrated good  understanding of the education provided.     See EMR under Patient Instructions for exercises provided prior visit.    Assessment   Pt demonstrates scapular weakness in RUE. Emphasis was focused on scapular control and improving ROM. Pt reported  increased pain with improving lifting technique however pain did not increase with there-ex today. Will continue to work on strengthening and improving ROM per pt tolerance.      Jennyfer is progressing well towards her goals.   Pt prognosis is Good.     Pt will continue to benefit from skilled outpatient physical therapy to address the deficits listed in the problem list box on initial evaluation, provide pt/family education and to maximize pt's level of independence in the home and community environment.     Pt's spiritual, cultural and educational needs considered and pt agreeable to plan of care and goals.     Anticipated barriers to physical therapy: none    Goals: Goals:  Short Term Goals: 4 weeks   1. Independent with HEP. ( progressing, not met)   2. Report decreased R UE pain < or =  7/10 with adls such as  lifting, sleeping on right side, combing her hair.( progressing, not met)   3. Increased MMT for B UE by 1 muscle grade to promote proper scapular stability to decrease R UE pain < or =  7/10 with adls such as  lifting, sleeping on right side, combing her hair. ( progressing, not met)   4. Increased PROM R shoulder flex to 165 deg, R shoulder PROM to 165 deg( progressing, not met)         Long Term Goals: 8 weeks   5. Increased R shoulder AROM flex to 165 deg, R shoulder AROM abd to 165 deg( progressing, not met)   6. Report decreased R UE pain < or =  5/10 with adls such as  lifting, sleeping on right side, combing her hair( progressing, not met)   7. Increased MMT for B UE to 4/5 muscle grade to promote proper scapular stability to decrease R UE pain < or =  5/10 with adls such as  lifting, sleeping on right side, combing her hair ( progressing, not met)   8. Patient to achieve CJ (at least 20% < 40% impaired, limited or restricted) level on the FOTO Outcomes Measurement System.( progressing, not met)          Plan     Cont to progress towards goals set by PT.  Work to increase scap strength and shld ROM  next visit.      Yoel Cage, PTA

## 2020-08-13 NOTE — PROGRESS NOTES
Subjective:       Patient ID: Jennyfer Bobby is a 30 y.o. female.    Vitals:  height is 5' (1.524 m) and weight is 52.2 kg (115 lb 1.3 oz). Her temperature is 98.3 °F (36.8 °C). Her blood pressure is 122/68 and her pulse is 67. Her respiration is 20 and oxygen saturation is 97%.     Chief Complaint: Shoulder Injury (Right)    Patient here today for follow up visit on Right shoulder pain. Patient states she is having increased pain today in right shoulder due to over use today at work.    Shoulder Injury   The incident occurred at work. The right shoulder is affected. The incident occurred more than 1 week ago. Injury mechanism: pulling. The quality of the pain is described as aching. The pain does not radiate. The pain is at a severity of 8/10. The pain is moderate. Associated symptoms include muscle weakness. The symptoms are aggravated by overhead lifting, palpation and movement. She has tried NSAIDs, heat and ice (PT) for the symptoms. The treatment provided mild relief.       Constitution: Negative for fatigue.   HENT: Negative for facial swelling and facial trauma.    Neck: Negative for neck stiffness.   Cardiovascular: Negative for chest trauma.   Eyes: Negative for eye trauma, double vision and blurred vision.   Gastrointestinal: Negative for abdominal trauma, abdominal pain and rectal bleeding.   Genitourinary: Negative for hematuria, missed menses, genital trauma and pelvic pain.   Musculoskeletal: Positive for pain, joint pain, abnormal ROM of joint and muscle ache. Negative for trauma and joint swelling.   Skin: Negative for color change, wound, abrasion, laceration, erythema and bruising.   Neurological: Negative for dizziness, history of vertigo, light-headedness, coordination disturbances, altered mental status and loss of consciousness.   Hematologic/Lymphatic: Negative for history of bleeding disorder.   Psychiatric/Behavioral: Negative for altered mental status.       Objective:      Physical  Exam   Constitutional: She is oriented to person, place, and time. She appears well-developed.   HENT:   Head: Normocephalic and atraumatic. Head is without abrasion, without contusion and without laceration.   Ears:   Right Ear: External ear normal.   Left Ear: External ear normal.   Nose: Nose normal.   Eyes: Pupils are equal, round, and reactive to light. Conjunctivae and lids are normal.   Neck: Trachea normal, full passive range of motion without pain and phonation normal. Neck supple.   Cardiovascular: Normal rate and normal pulses.   Pulses:       Radial pulses are 2+ on the right side and 2+ on the left side.   Pulmonary/Chest: Effort normal. No stridor. No respiratory distress.   Musculoskeletal:         General: Tenderness present.      Right shoulder: She exhibits decreased range of motion, tenderness, pain and decreased strength.        Arms:       Comments: There has been no change in assessment over previous visit   Neurological: She is alert and oriented to person, place, and time.   Skin: Skin is warm, dry and no rash. Capillary refill takes less than 2 seconds. abrasion, burn, bruising, erythema, ecchymosis and lesionPsychiatric: Her speech is normal and behavior is normal. Judgment and thought content normal.   Nursing note and vitals reviewed.        Assessment:       1. Work related injury    2. Tendinitis of right rotator cuff    3. Decreased range of motion of right shoulder    4. Muscle weakness of right upper extremity        Plan:         Work related injury  -     diclofenac (VOLTAREN) 50 MG EC tablet; Take 1 tablet (50 mg total) by mouth 2 (two) times daily as needed (pain).  Dispense: 30 tablet; Refill: 0  -     methocarbamoL (ROBAXIN) 750 MG Tab; Take 1 tablet (750 mg total) by mouth 4 (four) times daily as needed.  Dispense: 46 tablet; Refill: 0    Tendinitis of right rotator cuff  -     diclofenac (VOLTAREN) 50 MG EC tablet; Take 1 tablet (50 mg total) by mouth 2 (two) times daily as  needed (pain).  Dispense: 30 tablet; Refill: 0  -     methocarbamoL (ROBAXIN) 750 MG Tab; Take 1 tablet (750 mg total) by mouth 4 (four) times daily as needed.  Dispense: 46 tablet; Refill: 0    Decreased range of motion of right shoulder  -     diclofenac (VOLTAREN) 50 MG EC tablet; Take 1 tablet (50 mg total) by mouth 2 (two) times daily as needed (pain).  Dispense: 30 tablet; Refill: 0  -     methocarbamoL (ROBAXIN) 750 MG Tab; Take 1 tablet (750 mg total) by mouth 4 (four) times daily as needed.  Dispense: 46 tablet; Refill: 0    Muscle weakness of right upper extremity  -     diclofenac (VOLTAREN) 50 MG EC tablet; Take 1 tablet (50 mg total) by mouth 2 (two) times daily as needed (pain).  Dispense: 30 tablet; Refill: 0  -     methocarbamoL (ROBAXIN) 750 MG Tab; Take 1 tablet (750 mg total) by mouth 4 (four) times daily as needed.  Dispense: 46 tablet; Refill: 0          Patient Instructions: Attention not to aggravate affected area, Daily home exercises/warm soaks, Apply ice 24-48 hours then apply heat/warm soaks, Continue Physical Therapy(Please take medication as directed for pain and discomfort)    Restrictions: Avoid frequent bending/lifting/twisting, Avoid climbing/kneeling/squatting, No lifting/pushing/pulling more than 10 lbs, No above the shoulder/overhead work, Limited use of right hand and arm     Return Appointment: 08/27/2020 at 11am

## 2020-08-13 NOTE — LETTER
[facility] [address] [address]  Phone: [phone]  Fax: [phone]  [facility]: [id number]    Pt Name: Jennyfer Bobby  Injury Date: 07/02/2020    Date of First Treatment: 08/13/2020   Company: [employer]      Appointment Time: 10:45 AM Arrived: 11am   Provider: Joseline Shearer NP Time Out:12pm     Office Treatment:   1. Work related injury    2. Tendinitis of right rotator cuff    3. Decreased range of motion of right shoulder    4. Muscle weakness of right upper extremity      Medications Ordered This Encounter   Medications    diclofenac (VOLTAREN) 50 MG EC tablet    methocarbamoL (ROBAXIN) 750 MG Tab      Patient Instructions: Attention not to aggravate affected area, Daily home exercises/warm soaks, Apply ice 24-48 hours then apply heat/warm soaks, Continue Physical Therapy(Please take medication as directed for pain and discomfort)    Restrictions: Avoid frequent bending/lifting/twisting, Avoid climbing/kneeling/squatting, No lifting/pushing/pulling more than 10 lbs, No above the shoulder/overhead work, Limited use of right hand and arm     Return Appointment: 08/27/2020 at 11am

## 2020-08-14 ENCOUNTER — DOCUMENTATION ONLY (OUTPATIENT)
Dept: URGENT CARE | Facility: CLINIC | Age: 30
End: 2020-08-14

## 2020-08-14 NOTE — PROGRESS NOTES
Attempted to send OVMR for OV 8/13/20 to employer. Unable to find an e-mail address or fax number in media or [employer].

## 2020-08-20 ENCOUNTER — CLINICAL SUPPORT (OUTPATIENT)
Dept: REHABILITATION | Facility: OTHER | Age: 30
End: 2020-08-20
Payer: MEDICAID

## 2020-08-20 DIAGNOSIS — M25.511 ACUTE PAIN OF RIGHT SHOULDER: ICD-10-CM

## 2020-08-20 DIAGNOSIS — M25.611 DECREASED RANGE OF MOTION OF RIGHT SHOULDER: ICD-10-CM

## 2020-08-20 DIAGNOSIS — M62.81 MUSCLE WEAKNESS OF RIGHT UPPER EXTREMITY: ICD-10-CM

## 2020-08-20 PROCEDURE — 97110 THERAPEUTIC EXERCISES: CPT | Mod: PN,CQ

## 2020-08-20 NOTE — PROGRESS NOTES
Physical Therapy Treatment Note     Name: Jennyfer Bobby  Clinic Number: [id number]    Therapy Diagnosis:   Encounter Diagnoses   Name Primary?    Acute pain of right shoulder     Muscle weakness of right upper extremity     Decreased range of motion of right shoulder      Physician: Surjit Silva MD    Visit Date: 8/20/2020    Physician Orders: PT Eval and Treat   Medical Diagnosis: S43.411D (ICD-10-CM) - Sprain of right coracohumeral ligament  Evaluation Date: 7/22/2020  Authorization Period Expiration: 7/31/2020  Plan of Care Certification Period: 9/16/2020  Visit # / Visits authorized: 4/10 (1/10)     Time In: 0930  Time Out: 1015  Total Billable Time: 45 minutes     Precautions: Standard       Subjective     Pt states feeling much better today. States she received some new medication from her MD and it seems to be helping.   She was compliant with home exercise program.  Response to previous treatment:some muscle soreness  Functional change: no change     Pain: 2/10  Location: right shoulder      Objective     Jennyfer received therapeutic exercises to develop strength, endurance, ROM, flexibility, posture and core stabilization for 45 minutes including:    Scap retractions 30 x 5 sec hold otb   ER/IR step outs otb 20 x ea   Pendulums 4 way 2 min ea   Table slides 20 x 5 sec hold    Pulley flexion/ abduction  3 min   Isometric flex, ext, abd 20 x ea    Home Exercises Provided and Patient Education Provided     Education provided:   - Pt edu on proper exercise technique.      Written Home Exercises Provided: Patient instructed to cont prior HEP.  Exercises were reviewed and Jennyfer was able to demonstrate them prior to the end of the session.  Jennyfer demonstrated good  understanding of the education provided.     See EMR under Patient Instructions for exercises provided prior visit.    Assessment   Pt demonstrates scapular weakness in RUE. Emphasis was focused on scapular control and improving ROM. Pt  reported pain appears to be decreasing to tolerable levels.  Will continue to work on strengthening and improving ROM per pt tolerance.      Jennyfer is progressing well towards her goals.   Pt prognosis is Good.     Pt will continue to benefit from skilled outpatient physical therapy to address the deficits listed in the problem list box on initial evaluation, provide pt/family education and to maximize pt's level of independence in the home and community environment.     Pt's spiritual, cultural and educational needs considered and pt agreeable to plan of care and goals.     Anticipated barriers to physical therapy: none    Goals: Goals:  Short Term Goals: 4 weeks   1. Independent with HEP. ( progressing, not met)   2. Report decreased R UE pain < or =  7/10 with adls such as  lifting, sleeping on right side, combing her hair.( progressing, not met)   3. Increased MMT for B UE by 1 muscle grade to promote proper scapular stability to decrease R UE pain < or =  7/10 with adls such as  lifting, sleeping on right side, combing her hair. ( progressing, not met)   4. Increased PROM R shoulder flex to 165 deg, R shoulder PROM to 165 deg( progressing, not met)         Long Term Goals: 8 weeks   5. Increased R shoulder AROM flex to 165 deg, R shoulder AROM abd to 165 deg( progressing, not met)   6. Report decreased R UE pain < or =  5/10 with adls such as  lifting, sleeping on right side, combing her hair( progressing, not met)   7. Increased MMT for B UE to 4/5 muscle grade to promote proper scapular stability to decrease R UE pain < or =  5/10 with adls such as  lifting, sleeping on right side, combing her hair ( progressing, not met)   8. Patient to achieve CJ (at least 20% < 40% impaired, limited or restricted) level on the FOTO Outcomes Measurement System.( progressing, not met)          Plan     Cont to progress towards goals set by PT.  Work to increase scap strength and shld ROM next visit.      Yoel Cage, PTA

## 2020-08-24 ENCOUNTER — DOCUMENTATION ONLY (OUTPATIENT)
Dept: REHABILITATION | Facility: OTHER | Age: 30
End: 2020-08-24

## 2020-08-24 NOTE — PROGRESS NOTES
Patient was scheduled for a follow up physical therapy appointment at [facility] location on 8/24/2020. Patient failed to appear for the appointment without prior notification today. Attempted to call pt at approximqtely 11:38 am concerning missed appointment with no answer/voicemail available.         Yoel Cage, PTA    8/24/2020

## 2020-08-27 ENCOUNTER — CLINICAL SUPPORT (OUTPATIENT)
Dept: REHABILITATION | Facility: OTHER | Age: 30
End: 2020-08-27
Payer: MEDICAID

## 2020-08-27 DIAGNOSIS — M25.611 DECREASED RANGE OF MOTION OF RIGHT SHOULDER: ICD-10-CM

## 2020-08-27 DIAGNOSIS — M62.81 MUSCLE WEAKNESS OF RIGHT UPPER EXTREMITY: ICD-10-CM

## 2020-08-27 DIAGNOSIS — M25.511 ACUTE PAIN OF RIGHT SHOULDER: ICD-10-CM

## 2020-08-27 PROCEDURE — 97140 MANUAL THERAPY 1/> REGIONS: CPT | Mod: PN,CQ

## 2020-08-27 PROCEDURE — 97110 THERAPEUTIC EXERCISES: CPT | Mod: PN,CQ

## 2020-08-27 NOTE — PROGRESS NOTES
Physical Therapy Treatment Note     Name: Jennyfer Bobby  Clinic Number: [id number]    Therapy Diagnosis:   Encounter Diagnoses   Name Primary?    Acute pain of right shoulder     Muscle weakness of right upper extremity     Decreased range of motion of right shoulder      Physician: Surjit Silva MD    Visit Date: 8/27/2020    Physician Orders: PT Eval and Treat   Medical Diagnosis: S43.411D (ICD-10-CM) - Sprain of right coracohumeral ligament  Evaluation Date: 7/22/2020  Authorization Period Expiration: 7/31/2020  Plan of Care Certification Period: 9/16/2020  Visit # / Visits authorized: 5/10 (2/10)     Time In: 0945  Time Out: 1038   Total Billable Time: 50 minutes     Precautions: Standard       Subjective     Pt reports w/ mild discomfort in R upper trap but no c/o pn in R shld.    She was compliant with home exercise program.  Response to previous treatment:no adverse effects  Functional change: no change     Pain: 0/10  Location: right shoulder      Objective     Jennyfer received therapeutic exercises to develop strength, endurance, ROM, flexibility, posture and core stabilization for 45 minutes ( 42 min 1 on 1 with PTA )  including:    Scap retractions 30 x 5 sec hold otb   ER/IR step outs otb 30 x ea   Shld rows and ext  3 x 10 OTB   Pendulums 4 way 2 min ea   Table slides flexion and scaption 20 x 5 sec hold    Pulley flexion/ abduction  3 min   Supine SA punch w/ dowel 2 x 10   Isometric flex, ext, abd 30 x ea    Manual treatment: 8 min   Cupping single cup static 5 min to R upper trap   STM upper trap 3 min     Home Exercises Provided and Patient Education Provided     Education provided:   - Pt edu on proper exercise technique.      Written Home Exercises Provided: Patient instructed to cont prior HEP.  Exercises were reviewed and Jennyfer was able to demonstrate them prior to the end of the session.  Jennyfer demonstrated good  understanding of the education provided.     See EMR under Patient  Instructions for exercises provided prior visit.    Assessment   Pt able to control scap elevation with VCs.  Pt karel tx well w/ no c/o pn.  Pt demonstrated increased strength during therex.  Pt cont to have some shld weakness.    Jennyfer is progressing well towards her goals.   Pt prognosis is Good.     Pt will continue to benefit from skilled outpatient physical therapy to address the deficits listed in the problem list box on initial evaluation, provide pt/family education and to maximize pt's level of independence in the home and community environment.     Pt's spiritual, cultural and educational needs considered and pt agreeable to plan of care and goals.     Anticipated barriers to physical therapy: none    Goals: Goals:  Short Term Goals: 4 weeks   1. Independent with HEP. ( progressing, not met)   2. Report decreased R UE pain < or =  7/10 with adls such as  lifting, sleeping on right side, combing her hair.( progressing, not met)   3. Increased MMT for B UE by 1 muscle grade to promote proper scapular stability to decrease R UE pain < or =  7/10 with adls such as  lifting, sleeping on right side, combing her hair. ( progressing, not met)   4. Increased PROM R shoulder flex to 165 deg, R shoulder PROM to 165 deg( progressing, not met)         Long Term Goals: 8 weeks   5. Increased R shoulder AROM flex to 165 deg, R shoulder AROM abd to 165 deg( progressing, not met)   6. Report decreased R UE pain < or =  5/10 with adls such as  lifting, sleeping on right side, combing her hair( progressing, not met)   7. Increased MMT for B UE to 4/5 muscle grade to promote proper scapular stability to decrease R UE pain < or =  5/10 with adls such as  lifting, sleeping on right side, combing her hair ( progressing, not met)   8. Patient to achieve CJ (at least 20% < 40% impaired, limited or restricted) level on the FOTO Outcomes Measurement System.( progressing, not met)          Plan     Cont to progress towards goals set  by PT.  Cont to improve scap strength and shld ROM next visit.      Celso Pederson, PTA

## 2020-08-31 ENCOUNTER — CLINICAL SUPPORT (OUTPATIENT)
Dept: REHABILITATION | Facility: OTHER | Age: 30
End: 2020-08-31
Payer: MEDICAID

## 2020-08-31 ENCOUNTER — OFFICE VISIT (OUTPATIENT)
Dept: URGENT CARE | Facility: CLINIC | Age: 30
End: 2020-08-31
Payer: OTHER MISCELLANEOUS

## 2020-08-31 VITALS
HEIGHT: 60 IN | TEMPERATURE: 98 F | BODY MASS INDEX: 22.58 KG/M2 | OXYGEN SATURATION: 98 % | SYSTOLIC BLOOD PRESSURE: 102 MMHG | WEIGHT: 115 LBS | DIASTOLIC BLOOD PRESSURE: 57 MMHG | HEART RATE: 80 BPM

## 2020-08-31 DIAGNOSIS — M25.511 ACUTE PAIN OF RIGHT SHOULDER: ICD-10-CM
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
## 2020-08-31 DIAGNOSIS — M25.611 DECREASED RANGE OF MOTION OF RIGHT SHOULDER: ICD-10-CM

## 2020-08-31 DIAGNOSIS — M75.81 TENDINITIS OF RIGHT ROTATOR CUFF: ICD-10-CM

## 2020-08-31 DIAGNOSIS — M62.81 MUSCLE WEAKNESS OF RIGHT UPPER EXTREMITY: ICD-10-CM

## 2020-08-31 PROCEDURE — 99214 PR OFFICE/OUTPT VISIT, EST, LEVL IV, 30-39 MIN: ICD-10-PCS | Mod: S$GLB,,, | Performed by: NURSE PRACTITIONER

## 2020-08-31 PROCEDURE — 97110 THERAPEUTIC EXERCISES: CPT | Mod: PN

## 2020-08-31 PROCEDURE — 99214 OFFICE O/P EST MOD 30 MIN: CPT | Mod: S$GLB,,, | Performed by: NURSE PRACTITIONER

## 2020-08-31 RX ORDER — DEXTROMETHORPHAN HYDROBROMIDE, GUAIFENESIN 5; 100 MG/5ML; MG/5ML
650 LIQUID ORAL EVERY 8 HOURS
Refills: 0 | COMMUNITY
Start: 2020-08-31 | End: 2021-04-06

## 2020-08-31 NOTE — LETTER
[facility] [address] [address]  Phone: [phone]  Fax: [phone]  [facility]: [id number]    Pt Name: Jennyfer Bobby  Injury Date: 07/02/2020    Date of First Treatment: 08/31/2020   Company: [employer]     Appointment Time: 01:15 PM Arrived: 100pm   Provider: Joseline Shearer NP Time Out:130pm     Office Treatment:   1. Work related injury    2. Tendinitis of right rotator cuff    3. Decreased range of motion of right shoulder    4. Muscle weakness of right upper extremity      Medications Ordered This Encounter   Medications    acetaminophen (TYLENOL) 650 MG TbSR      Patient Instructions: Attention not to aggravate affected area, Continue Physical Therapy    Restrictions: Avoid frequent bending/lifting/twisting, No lifting/pushing/pulling more than 10 lbs, No above the shoulder/overhead work     Return Appointment: 09/14/2020 at 130pm

## 2020-08-31 NOTE — PROGRESS NOTES
Physical Therapy Treatment Note     Name: Jennyfer Bobby  Clinic Number: [id number]    Therapy Diagnosis:   Encounter Diagnoses   Name Primary?    Acute pain of right shoulder     Muscle weakness of right upper extremity     Decreased range of motion of right shoulder      Physician: Surjit Silva MD    Visit Date: 8/31/2020    Physician Orders: PT Eval and Treat   Medical Diagnosis: S43.411D (ICD-10-CM) - Sprain of right coracohumeral ligament  Evaluation Date: 7/22/2020  Authorization Period Expiration: 7/31/2020  Plan of Care Certification Period: 9/16/2020  Visit # / Visits authorized: 7/10 (3/10)     Time In: 11:15 AM  Time Out: 11:55 AM   Total Billable Time: 40 minutes     Precautions: Standard       Subjective     Pt reports she is able to  her R shoulder more, but is still guarded with it. States she doesn't move it around too fast. States she is doing her exercises at home. Still limiting how mych she lifts  She was compliant with home exercise program.  Response to previous treatment:no adverse effects  Functional change: no change     Pain: 0/10  Location: right shoulder      Objective          Cervical AROM     flexion 45 deg   extension 35 deg   R rotation 60 deg   L rotation 60 deg   R side bending 30 deg   L side bending 35 deg          UE MMT R L   C3 Cervical side bend 5/5 5/5   C4 Shoulder Shrug 5/5  5/5   Shoulder flexion 5/5 5/5   Shoulder abduction 5/5 5/5   Shoulder IR 5/5 5/5   Shoulder ER 5/5 5/5   C5 Elbow flexion 5/5 5/5   C7 Elbow extension 5/5 5/5   C6 Wrist extension 5/5 5/5   Wrist flexion 5/5 5/5   Scapular protraction 3/5 5/5   Mid Trap 3+/5 3+/5   Lower Traps 3+/5 3+/5         UE Special Tests     Benavidez-Erickson Positive R   Neer Impingement Positive R   Yergason's negative   Empty Can Positive R      Joint Mobility                                        R  (A/P)                  L  Shoulder flex                                  140 deg/170             170  "deg  Shoulder abd                                  130 deg/180             180 deg  Shoulder ER at 45 abd                      90 deg                     full  Shoulder IR  At 45 abd                       70 deg                    full        Jennyfer received therapeutic exercises to develop strength, endurance, ROM, flexibility, posture and core stabilization for 40 minutes including:  Reassessed strength and ROM  UBE 3' forward/3' reverse  +prone Ws 20 x 3"  +prone Ts 20 x 3"  +prone Ys 20 x 3"  +prone Is 20 x 3"  Shoulder ER with OTB 30x  +lat pull dows with OTB x 30 reps  shoulder ext with OTB x 30 reps  shoulder rows with OTB x 30 reps    Scap retractions 30 x 5 sec hold otb   Shld rows and ext  3 x 10 OTB   Pendulums 4 way 2 min ea   Table slides flexion and scaption 20 x 5 sec hold    Pulley flexion/ abduction  3 min   Supine SA punch w/ dowel 2 x 10   Isometric flex, ext, abd 30 x ea    Manual treatment: 00 min   Cupping single cup static 5 min to R upper trap   STM upper trap 3 min     Home Exercises Provided and Patient Education Provided     Education provided:   Prone Ws  Prone Ts  Prone Ys  Prone Is.      Written Home Exercises Provided: Patient instructed to cont prior HEP.  Exercises were reviewed and Jennyfer was able to demonstrate them prior to the end of the session.  Jennyfer demonstrated good  understanding of the education provided.     See EMR under Patient Instructions for exercises provided 8/31/2020 and  prior visit.    Assessment   Pt progressing well in OP PT with increased R shoulder AROM and strength. Weakness in scapular stabilizers present. Initiated prone scapular strengthening exercises. Continues to experience R shoulder pain at end range flexion.     Jennyfer is progressing well towards her goals.   Pt prognosis is Good.     Pt will continue to benefit from skilled outpatient physical therapy to address the deficits listed in the problem list box on initial evaluation, provide pt/family " education and to maximize pt's level of independence in the home and community environment.     Pt's spiritual, cultural and educational needs considered and pt agreeable to plan of care and goals.     Anticipated barriers to physical therapy: none    Goals: Goals:  Short Term Goals: 4 weeks   1. Independent with HEP. (met)   2. Report decreased R UE pain < or =  7/10 with adls such as  lifting, sleeping on right side, combing her hair.(met)   3. Increased MMT for B UE by 1 muscle grade to promote proper scapular stability to decrease R UE pain < or =  7/10 with adls such as  lifting, sleeping on right side, combing her hair. (met)   4. Increased PROM R shoulder flex to 165 deg, R shoulder PROM to 165 deg (met)         Long Term Goals: 8 weeks   5. Increased R shoulder AROM flex to 165 deg, R shoulder AROM abd to 165 deg( progressing, not met)   6. Report decreased R UE pain < or =  5/10 with adls such as  lifting, sleeping on right side, combing her hair( progressing, not met)   7. Increased MMT for B UE to 4/5 muscle grade to promote proper scapular stability to decrease R UE pain < or =  5/10 with adls such as  lifting, sleeping on right side, combing her hair ( progressing, not met)   8. Patient to achieve CJ (at least 20% < 40% impaired, limited or restricted) level on the F[employer] Outcomes Measurement System. (met)          Plan     Continue with scapular stabilization exercises.     Og Llanos, PT

## 2020-08-31 NOTE — PROGRESS NOTES
Subjective:       Patient ID: Jennyfer Bobby is a 30 y.o. female.    Chief Complaint: Follow-up (right shoulder)    Pt is following up for right shoulder injury that happened in June. Pt says therapy has helped although she has slight pain when sleeping and is unable to fully abduct the right arm    Follow-up  This is a recurrent problem. The current episode started more than 1 month ago. The problem occurs intermittently. The problem has been gradually improving. Associated symptoms include arthralgias and myalgias. Pertinent negatives include no abdominal pain, fatigue, joint swelling, vertigo or weakness. The symptoms are aggravated by exertion, twisting and stress. She has tried heat and ice (PT) for the symptoms. The treatment provided mild relief.       Constitution: Negative for fatigue.   HENT: Negative for facial swelling and facial trauma.    Neck: Negative for neck stiffness.   Cardiovascular: Negative for chest trauma.   Eyes: Negative for eye trauma, double vision and blurred vision.   Gastrointestinal: Negative for abdominal trauma, abdominal pain and rectal bleeding.   Genitourinary: Negative for hematuria, missed menses, genital trauma and pelvic pain.   Musculoskeletal: Positive for pain, trauma, joint pain, abnormal ROM of joint and muscle ache. Negative for joint swelling and muscle cramps.   Skin: Negative for color change, wound, abrasion, laceration, erythema and bruising.   Neurological: Negative for dizziness, history of vertigo, light-headedness, coordination disturbances, altered mental status and loss of consciousness.   Hematologic/Lymphatic: Negative for history of bleeding disorder.   Psychiatric/Behavioral: Negative for altered mental status.        Objective:      Physical Exam  Vitals signs and nursing note reviewed.   Constitutional:       Appearance: She is well-developed.   HENT:      Head: Normocephalic and atraumatic. No abrasion, contusion or laceration.      Right Ear:  External ear normal.      Left Ear: External ear normal.      Nose: Nose normal.   Eyes:      General: Lids are normal.      Conjunctiva/sclera: Conjunctivae normal.      Pupils: Pupils are equal, round, and reactive to light.   Neck:      Musculoskeletal: Full passive range of motion without pain and neck supple.      Trachea: Trachea and phonation normal.   Cardiovascular:      Rate and Rhythm: Normal rate.      Pulses: Normal pulses.           Radial pulses are 2+ on the right side and 2+ on the left side.   Pulmonary:      Effort: Pulmonary effort is normal. No respiratory distress.      Breath sounds: No stridor.   Musculoskeletal:         General: Tenderness present.      Right shoulder: She exhibits decreased range of motion, tenderness, pain and decreased strength.        Arms:       Comments: Finished PT today, more will be ordered so pt can reach MMI.   Skin:     General: Skin is warm and dry.      Capillary Refill: Capillary refill takes less than 2 seconds.      Findings: No abrasion, bruising, burn, ecchymosis, erythema, lesion or rash.   Neurological:      Mental Status: She is alert and oriented to person, place, and time.   Psychiatric:         Speech: Speech normal.         Behavior: Behavior normal.         Thought Content: Thought content normal.         Judgment: Judgment normal.         Assessment:       1. Work related injury    2. Tendinitis of right rotator cuff    3. Decreased range of motion of right shoulder    4. Muscle weakness of right upper extremity        Plan:         Medications Ordered This Encounter   Medications    acetaminophen (TYLENOL) 650 MG TbSR     Sig: Take 1 tablet (650 mg total) by mouth every 8 (eight) hours.     Refill:  0     Patient Instructions: Attention not to aggravate affected area, Continue Physical Therapy   Restrictions: Avoid frequent bending/lifting/twisting, No lifting/pushing/pulling more than 10 lbs, No above the shoulder/overhead work  Follow up in  about 2 weeks (around 9/14/2020).

## 2020-08-31 NOTE — PATIENT INSTRUCTIONS
PRONE W        Lying face down with your elbows bent and palms facing downward, slowly raise your arms up towards the ceiling as you squeeze your shoulder blades downward and towards your spine.     Hold for  3  Seconds. Perform  20  Reps.    Copyright © 7391-0903 [employer]      PRONE Y        Lying face down with your arms stretched out upwards as shown, slowly move your arms upward towards the ceiling as you squeeze your shoulder blades downward and towards your spine.     Hold for  3  Seconds. Perform  20  Reps.    Copyright © 5100-2773 [employer]    PRONE RETRACTION EXTENSION - PRONE I          Lying face down with your arms by your side, slowly move your arms upward towards the ceiling as you squeeze your shoulder blades downwards and towards your spine.     Hold for  3  Seconds. Perform  20  Reps.    Copyright © 9074-5582 [employer]    Prone Ts        Start Position: arms out to your sides (like an airplane wing) Palms facing down.    End position: squeeze your shoulder blades together and raise your arms up     Hold for  3  Seconds. Perform  20  Reps.    Copyright © 3696-5627 [employer]

## 2020-09-24 ENCOUNTER — CLINICAL SUPPORT (OUTPATIENT)
Dept: REHABILITATION | Facility: OTHER | Age: 30
End: 2020-09-24
Payer: OTHER MISCELLANEOUS

## 2020-09-24 DIAGNOSIS — M62.81 MUSCLE WEAKNESS OF RIGHT UPPER EXTREMITY: ICD-10-CM

## 2020-09-24 DIAGNOSIS — M25.511 ACUTE PAIN OF RIGHT SHOULDER: Primary | ICD-10-CM

## 2020-09-24 DIAGNOSIS — M25.611 DECREASED RANGE OF MOTION OF RIGHT SHOULDER: ICD-10-CM

## 2020-09-24 PROCEDURE — 97110 THERAPEUTIC EXERCISES: CPT | Mod: PN

## 2020-09-24 NOTE — PLAN OF CARE
Physical Therapy Treatment Note/ Progress Note     Name: Jennyfer Bobby  Clinic Number: [id number]    Therapy Diagnosis:   Encounter Diagnoses   Name Primary?    Acute pain of right shoulder Yes    Muscle weakness of right upper extremity     Decreased range of motion of right shoulder      Physician: Joseline Shearer NP    Visit Date: 9/24/2020    Physician Orders: PT Eval and Treat   Medical Diagnosis: S43.411D (ICD-10-CM) - Sprain of right coracohumeral ligament  Evaluation Date: 7/22/2020; Reassessed 9/24/2020  Authorization Period Expiration: 8/31/2021  Plan of Care Certification Period: 12/24/2020  Visit # / Visits authorized: 1/9     Time In: 4:30  Time Out: 5:00    Total Billable Time: 30 minutes     Precautions: Standard       Subjective   Pt returned to PT today, she reports that she has been completing her HEP regularly. Lately has been trying to do more at work, and lifted a heavier box. Reports 5/10 pain today and increased soreness.     She was compliant with home exercise program.  Response to previous treatment:no adverse effects  Functional change: no change     Pain: 5/10  Location: right shoulder      Objective         UE MMT R 8/31 R 9/24   C3 Cervical side bend 5/5 5/5   C4 Shoulder Shrug 5/5  5/5   Shoulder flexion 5/5 4+/5   Shoulder abduction 5/5 4+/5   Shoulder IR 5/5 5/5   Shoulder ER 5/5 4+/5P!   C5 Elbow flexion 5/5 5/5   C7 Elbow extension 5/5 5/5   C6 Wrist extension 5/5 5/5   Wrist flexion 5/5 5/5   Scapular protraction 3/5 3/5   Mid Trap 3+/5 3+/5 P!   Lower Traps 3+/5 3+/5 p!         UE Special Tests  9/24/2020   Benavidez-Erickson Positive R   Neer Impingement Positive R   Empty Can Positive R      Joint Mobility                                        R  (A/P)                  L  Shoulder flex                                  155 deg/160             170 deg  Shoulder abd                                  160 deg/170             180 deg  Shoulder ER at 45 abd                 "      Full                          full  Shoulder IR  At 45 abd                       Full                          full        Jennyfer received therapeutic exercises to develop strength, endurance, ROM, flexibility, posture and core stabilization for 40 minutes including:  Reassessed strength and ROM  UBE 3' forward/3' reverse  +prone Ws 10 x 3"  +prone Ts 10 x 3"  +prone Ys 10 x 3"  +prone Is 10 x 3"  Shoulder ER with OTB 15x  +lat pull dows with OTB x 30 reps  shoulder ext with OTB x 15 reps  shoulder rows with OTB x 15 reps    Scap retractions 30 x 5 sec hold otb   Shld rows and ext  3 x 10 OTB   Pendulums 4 way 2 min ea   Table slides flexion and scaption 20 x 5 sec hold    Pulley flexion/ abduction  3 min   Supine SA punch w/ dowel 2 x 10   Isometric flex, ext, abd 30 x ea    Manual treatment: 00 min   Cupping single cup static 5 min to R upper trap   STM upper trap 3 min     Home Exercises Provided and Patient Education Provided     Education provided:   Prone Ws  Prone Ts  Prone Ys  Prone Is.      Written Home Exercises Provided: Patient instructed to cont prior HEP.  Exercises were reviewed and Jennyfer was able to demonstrate them prior to the end of the session.  Jennyfer demonstrated good  understanding of the education provided.     See EMR under Patient Instructions for exercises provided 8/31/2020 and  prior visit.    Assessment   Pt was reassessed at this treatment session. Pt with slight increased shoulder ROM in flexion/abduction/IR/ER. She did demo slight decrease in shoulder strength with flexion and abduction. Decreased repetitions of exercises completed today 2/2 increased time since last treatment session. Pt required multiple rest breaks to complete exercises d/t poor endurance.     Jennyfer is progressing well towards her goals.   Pt prognosis is Good.     Pt will continue to benefit from skilled outpatient physical therapy to address the deficits listed in the problem list box on initial evaluation, " provide pt/family education and to maximize pt's level of independence in the home and community environment.     Pt's spiritual, cultural and educational needs considered and pt agreeable to plan of care and goals.     Anticipated barriers to physical therapy: none    Goals: Goals:  Short Term Goals: 4 weeks   1. Independent with HEP. (met)   2. Report decreased R UE pain < or =  7/10 with adls such as  lifting, sleeping on right side, combing her hair.(met)   3. Increased MMT for B UE by 1 muscle grade to promote proper scapular stability to decrease R UE pain < or =  7/10 with adls such as  lifting, sleeping on right side, combing her hair. (met)   4. Increased PROM R shoulder flex to 165 deg, R shoulder PROM to 165 deg (met)         Long Term Goals: 8 weeks   5. Increased R shoulder AROM flex to 165 deg, R shoulder AROM abd to 165 deg( progressing, not met)   6. Report decreased R UE pain < or =  5/10 with adls such as  lifting, sleeping on right side, combing her hair( progressing, not met)   7. Increased MMT for B UE to 4/5 muscle grade to promote proper scapular stability to decrease R UE pain < or =  5/10 with adls such as  lifting, sleeping on right side, combing her hair ( progressing, not met)   8. Patient to achieve CJ (at least 20% < 40% impaired, limited or restricted) level on the [employer] Outcomes Measurement System. (met)          Plan     Continue with scapular stabilization exercises.     Kirby Urrutia, PT   9/24/2020

## 2020-09-30 ENCOUNTER — CLINICAL SUPPORT (OUTPATIENT)
Dept: REHABILITATION | Facility: OTHER | Age: 30
End: 2020-09-30
Payer: OTHER MISCELLANEOUS

## 2020-09-30 DIAGNOSIS — M25.511 ACUTE PAIN OF RIGHT SHOULDER: Primary | ICD-10-CM

## 2020-09-30 DIAGNOSIS — M62.81 MUSCLE WEAKNESS OF RIGHT UPPER EXTREMITY: ICD-10-CM

## 2020-09-30 DIAGNOSIS — M25.611 DECREASED RANGE OF MOTION OF RIGHT SHOULDER: ICD-10-CM

## 2020-09-30 PROCEDURE — 97110 THERAPEUTIC EXERCISES: CPT | Mod: PN

## 2020-09-30 NOTE — PROGRESS NOTES
Physical Therapy Treatment Note     Name: Jennyfer Bobby  Clinic Number: [id number]    Therapy Diagnosis:   No diagnosis found.  Physician: Joseline Shearer NP    Visit Date: 9/30/2020    Physician Orders: PT Eval and Treat   Medical Diagnosis: S43.411D (ICD-10-CM) - Sprain of right coracohumeral ligament  Evaluation Date: 7/22/2020; Reassessed 9/24/2020  Authorization Period Expiration: 8/31/2021  Plan of Care Certification Period: 12/24/2020  Visit # / Visits authorized: 2/9     Time In: 3:15  Time Out: 4:00   Total Billable Time: 30 minutes     Precautions: Standard       Subjective   Pt with decreased shoulder pain today, she reports no soreness after last treatment     She was compliant with home exercise program.  Response to previous treatment:no adverse effects  Functional change: no change     Pain: 3/10  Location: right shoulder      Objective         UE MMT R 8/31 R 9/24   C3 Cervical side bend 5/5 5/5   C4 Shoulder Shrug 5/5  5/5   Shoulder flexion 5/5 4+/5   Shoulder abduction 5/5 4+/5   Shoulder IR 5/5 5/5   Shoulder ER 5/5 4+/5P!   C5 Elbow flexion 5/5 5/5   C7 Elbow extension 5/5 5/5   C6 Wrist extension 5/5 5/5   Wrist flexion 5/5 5/5   Scapular protraction 3/5 3/5   Mid Trap 3+/5 3+/5 P!   Lower Traps 3+/5 3+/5 p!         UE Special Tests  9/24/2020   Benavidez-Erickson Positive R   Neer Impingement Positive R   Empty Can Positive R      Joint Mobility                                        R  (A/P)                  L  Shoulder flex                                  155 deg/160             170 deg  Shoulder abd                                  160 deg/170             180 deg  Shoulder ER at 45 abd                      Full                          full  Shoulder IR  At 45 abd                       Full                          full        Jennyfer received therapeutic exercises to develop strength, endurance, ROM, flexibility, posture and core stabilization for 40 minutes including:  Reassessed  "strength and ROM  UBE 3' forward/3' reverse  +prone Ws 10 x 3"  +prone Ts 10 x 3"  +prone Ys 10 x 3"  +prone Is 10 x 3"  Shoulder ER with OTB 15x  +lat pull dows with OTB x 20 reps  shoulder ext with OTB 2x 15 reps  shoulder rows with OTB 2x 15 reps    Scap retractions 30 x 5 sec hold otb   Shld rows and ext  3 x 10 OTB   Pendulums 4 way 2 min ea   Table slides flexion and scaption 20 x 5 sec hold    Pulley flexion/ abduction  3 min   Supine SA punch w/ dowel 2 x 10   Isometric flex, ext, abd 30 x ea    Manual treatment: 00 min   Cupping single cup static 5 min to R upper trap   STM upper trap 3 min     Home Exercises Provided and Patient Education Provided     Education provided:   Prone Ws  Prone Ts  Prone Ys  Prone Is.      Written Home Exercises Provided: Patient instructed to cont prior HEP.  Exercises were reviewed and Jennyfer was able to demonstrate them prior to the end of the session.  Jennyfer demonstrated good  understanding of the education provided.     See EMR under Patient Instructions for exercises provided 8/31/2020 and  prior visit.    Assessment   Pt tolerated treatment session well with no reports of increased pain. She was able to complete all activities with minimal VC for technique. Cont do demo poor endurance by requiring increased rest breaks while completing exercises.     Jennyfer is progressing well towards her goals.   Pt prognosis is Good.     Pt will continue to benefit from skilled outpatient physical therapy to address the deficits listed in the problem list box on initial evaluation, provide pt/family education and to maximize pt's level of independence in the home and community environment.     Pt's spiritual, cultural and educational needs considered and pt agreeable to plan of care and goals.     Anticipated barriers to physical therapy: none    Goals: Goals:  Short Term Goals: 4 weeks   1. Independent with HEP. (met)   2. Report decreased R UE pain < or =  7/10 with adls such as  " lifting, sleeping on right side, combing her hair.(met)   3. Increased MMT for B UE by 1 muscle grade to promote proper scapular stability to decrease R UE pain < or =  7/10 with adls such as  lifting, sleeping on right side, combing her hair. (met)   4. Increased PROM R shoulder flex to 165 deg, R shoulder PROM to 165 deg (met)         Long Term Goals: 8 weeks   5. Increased R shoulder AROM flex to 165 deg, R shoulder AROM abd to 165 deg( progressing, not met)   6. Report decreased R UE pain < or =  5/10 with adls such as  lifting, sleeping on right side, combing her hair( progressing, not met)   7. Increased MMT for B UE to 4/5 muscle grade to promote proper scapular stability to decrease R UE pain < or =  5/10 with adls such as  lifting, sleeping on right side, combing her hair ( progressing, not met)   8. Patient to achieve CJ (at least 20% < 40% impaired, limited or restricted) level on the [employer] Outcomes Measurement System. (met)          Plan     Continue with scapular stabilization exercises.     Kirby Urrutia, PT   9/30/2020

## 2020-10-05 ENCOUNTER — PATIENT MESSAGE (OUTPATIENT)
Dept: ADMINISTRATIVE | Facility: HOSPITAL | Age: 30
End: 2020-10-05

## 2020-10-09 ENCOUNTER — OFFICE VISIT (OUTPATIENT)
Dept: URGENT CARE | Facility: CLINIC | Age: 30
End: 2020-10-09
Payer: OTHER MISCELLANEOUS

## 2020-10-09 VITALS
OXYGEN SATURATION: 98 % | HEART RATE: 78 BPM | HEIGHT: 60 IN | RESPIRATION RATE: 16 BRPM | BODY MASS INDEX: 22.58 KG/M2 | DIASTOLIC BLOOD PRESSURE: 86 MMHG | TEMPERATURE: 98 F | SYSTOLIC BLOOD PRESSURE: 118 MMHG | WEIGHT: 115 LBS

## 2020-10-09 DIAGNOSIS — M25.611 DECREASED RANGE OF MOTION OF RIGHT SHOULDER: ICD-10-CM

## 2020-10-09 DIAGNOSIS — S46.911D STRAIN OF RIGHT SHOULDER, SUBSEQUENT ENCOUNTER: Primary | ICD-10-CM
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
## 2020-10-09 DIAGNOSIS — M62.81 MUSCLE WEAKNESS OF RIGHT UPPER EXTREMITY: ICD-10-CM

## 2020-10-09 DIAGNOSIS — M75.81 TENDINITIS OF RIGHT ROTATOR CUFF: ICD-10-CM

## 2020-10-09 PROCEDURE — 99213 OFFICE O/P EST LOW 20 MIN: CPT | Mod: S$GLB,,, | Performed by: NURSE PRACTITIONER

## 2020-10-09 PROCEDURE — 99213 PR OFFICE/OUTPT VISIT, EST, LEVL III, 20-29 MIN: ICD-10-PCS | Mod: S$GLB,,, | Performed by: NURSE PRACTITIONER

## 2020-10-09 NOTE — PROGRESS NOTES
Subjective:       Patient ID: Jennyfer Bobby is a 30 y.o. female.    Chief Complaint: Follow-up    Pt here for f/u on work related injury to RT shoulder. Pt stated she isn't having much pain. She started PT again.    Follow-up  This is a new problem. The current episode started more than 1 month ago (June). The problem has been rapidly improving. Associated symptoms include arthralgias. Pertinent negatives include no chest pain, chills, congestion, coughing, fatigue, fever, headaches, joint swelling, myalgias, nausea, rash, sore throat, vertigo, vomiting or weakness. Nothing aggravates the symptoms. She has tried nothing for the symptoms.       Constitution: Negative for chills, fatigue and fever.   HENT: Negative for congestion and sore throat.    Neck: Negative for painful lymph nodes.   Cardiovascular: Negative for chest pain and leg swelling.   Eyes: Negative for double vision and blurred vision.   Respiratory: Negative for cough and shortness of breath.    Gastrointestinal: Negative for nausea, vomiting and diarrhea.   Genitourinary: Negative for dysuria, frequency, urgency and history of kidney stones.   Musculoskeletal: Positive for joint pain. Negative for joint swelling, muscle cramps and muscle ache.   Skin: Negative for color change, pale, rash, erythema and bruising.   Allergic/Immunologic: Negative for seasonal allergies.   Neurological: Negative for dizziness, history of vertigo, light-headedness, passing out and headaches.   Hematologic/Lymphatic: Negative for swollen lymph nodes.   Psychiatric/Behavioral: Negative for nervous/anxious, sleep disturbance and depression. The patient is not nervous/anxious.         Objective:      Physical Exam  Vitals signs and nursing note reviewed.   Constitutional:       Appearance: She is well-developed.   HENT:      Head: Normocephalic and atraumatic. No abrasion, contusion or laceration.      Right Ear: External ear normal.      Left Ear: External ear  normal.      Nose: Nose normal.   Eyes:      General: Lids are normal.      Conjunctiva/sclera: Conjunctivae normal.      Pupils: Pupils are equal, round, and reactive to light.   Neck:      Musculoskeletal: Full passive range of motion without pain and neck supple.      Trachea: Trachea and phonation normal.   Cardiovascular:      Rate and Rhythm: Normal rate.      Pulses: Normal pulses.           Radial pulses are 2+ on the right side and 2+ on the left side.   Pulmonary:      Effort: Pulmonary effort is normal. No respiratory distress.      Breath sounds: No stridor.   Musculoskeletal:         General: No tenderness.      Right shoulder: She exhibits decreased range of motion, pain and decreased strength.        Arms:    Skin:     General: Skin is warm and dry.      Capillary Refill: Capillary refill takes less than 2 seconds.      Findings: No abrasion, bruising, burn, ecchymosis, erythema, lesion or rash.   Neurological:      Mental Status: She is alert and oriented to person, place, and time.   Psychiatric:         Speech: Speech normal.         Behavior: Behavior normal.         Thought Content: Thought content normal.         Judgment: Judgment normal.         Assessment:       1. Tendinitis of right rotator cuff    2. Decreased range of motion of right shoulder    3. Muscle weakness of right upper extremity    4. Work related injury        Plan:            Patient Instructions: Continue Physical Therapy   Restrictions: Regular Duty  Follow up in about 3 weeks (around 10/30/2020).

## 2020-10-30 ENCOUNTER — OFFICE VISIT (OUTPATIENT)
Dept: URGENT CARE | Facility: CLINIC | Age: 30
End: 2020-10-30
Payer: OTHER MISCELLANEOUS

## 2020-10-30 VITALS
BODY MASS INDEX: 22.58 KG/M2 | TEMPERATURE: 97 F | HEIGHT: 60 IN | DIASTOLIC BLOOD PRESSURE: 71 MMHG | SYSTOLIC BLOOD PRESSURE: 107 MMHG | HEART RATE: 70 BPM | WEIGHT: 115 LBS | OXYGEN SATURATION: 99 % | RESPIRATION RATE: 16 BRPM

## 2020-10-30 DIAGNOSIS — M75.81 TENDINITIS OF RIGHT ROTATOR CUFF: ICD-10-CM

## 2020-10-30 DIAGNOSIS — M62.81 MUSCLE WEAKNESS OF RIGHT UPPER EXTREMITY: ICD-10-CM

## 2020-10-30 DIAGNOSIS — S46.911D STRAIN OF RIGHT SHOULDER, SUBSEQUENT ENCOUNTER: Primary | ICD-10-CM

## 2020-10-30 DIAGNOSIS — M25.611 DECREASED RANGE OF MOTION OF RIGHT SHOULDER: ICD-10-CM
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
## 2020-10-30 PROCEDURE — 99213 PR OFFICE/OUTPT VISIT, EST, LEVL III, 20-29 MIN: ICD-10-PCS | Mod: S$GLB,,, | Performed by: NURSE PRACTITIONER

## 2020-10-30 PROCEDURE — 99213 OFFICE O/P EST LOW 20 MIN: CPT | Mod: S$GLB,,, | Performed by: NURSE PRACTITIONER

## 2020-10-30 NOTE — PROGRESS NOTES
Subjective:       Patient ID: Jennyfer Bobby is a 30 y.o. female.    Chief Complaint: Shoulder Injury    Pt states that she is doing much better and has no pain with movement.     Shoulder Injury   The incident occurred at work. The right shoulder is affected. The incident occurred more than 1 week ago. The pain does not radiate. The pain is at a severity of 0/10. The patient is experiencing no pain. Nothing aggravates the symptoms. She has tried nothing for the symptoms. The treatment provided significant relief.       Constitution: Negative for fatigue.   HENT: Negative for facial swelling and facial trauma.    Neck: Negative for neck stiffness.   Cardiovascular: Negative for chest trauma.   Eyes: Negative for eye trauma, double vision and blurred vision.   Gastrointestinal: Negative for abdominal trauma, abdominal pain and rectal bleeding.   Genitourinary: Negative for hematuria, missed menses, genital trauma and pelvic pain.   Musculoskeletal: Negative for pain, trauma, joint swelling and abnormal ROM of joint.   Skin: Negative for color change, wound, abrasion, laceration, erythema and bruising.   Neurological: Negative for dizziness, history of vertigo, light-headedness, coordination disturbances, altered mental status and loss of consciousness.   Hematologic/Lymphatic: Negative for history of bleeding disorder.   Psychiatric/Behavioral: Negative for altered mental status.        Objective:      Physical Exam  Vitals signs and nursing note reviewed.   Constitutional:       Appearance: She is well-developed.   HENT:      Head: Normocephalic and atraumatic. No abrasion, contusion or laceration.      Right Ear: External ear normal.      Left Ear: External ear normal.      Nose: Nose normal.   Eyes:      General: Lids are normal.      Conjunctiva/sclera: Conjunctivae normal.      Pupils: Pupils are equal, round, and reactive to light.   Neck:      Musculoskeletal: Full passive range of motion without pain  and neck supple.      Trachea: Trachea and phonation normal.   Cardiovascular:      Rate and Rhythm: Normal rate.      Pulses: Normal pulses.           Radial pulses are 2+ on the right side and 2+ on the left side.   Pulmonary:      Effort: Pulmonary effort is normal. No respiratory distress.      Breath sounds: No stridor.   Musculoskeletal:         General: No tenderness.      Right shoulder: She exhibits decreased range of motion, pain and decreased strength.   Skin:     General: Skin is warm and dry.      Capillary Refill: Capillary refill takes less than 2 seconds.      Findings: No abrasion, bruising, burn, ecchymosis, erythema, lesion or rash.   Neurological:      Mental Status: She is alert and oriented to person, place, and time.   Psychiatric:         Speech: Speech normal.         Behavior: Behavior normal.         Thought Content: Thought content normal.         Judgment: Judgment normal.         Assessment:       1. Strain of right shoulder, subsequent encounter    2. Tendinitis of right rotator cuff    3. Decreased range of motion of right shoulder    4. Muscle weakness of right upper extremity    5. Work related injury        Plan:                Restrictions: Discharged from Occupational Health, Regular Duty  Follow up if symptoms worsen or fail to improve.

## 2020-10-30 NOTE — LETTER
[facility] [address] [address]  Phone: [phone]  Fax: [phone]  [facility]: [email or web address]    Pt Name: Jennyfer Bobby  Injury Date: 07/02/2020    Date of First Treatment: 10/30/2020   Company: [employer]      Appointment Time: 01:45 PM Arrived: 2pm   Provider: Joseline Shearer NP Time Out:240pm     Office Treatment:   1. Strain of right shoulder, subsequent encounter    2. Tendinitis of right rotator cuff    3. Decreased range of motion of right shoulder    4. Muscle weakness of right upper extremity    5. Work related injury               Restrictions: Discharged from Occupational Health, Regular Duty     Released from [facility]

## 2021-01-04 ENCOUNTER — PATIENT MESSAGE (OUTPATIENT)
Dept: ADMINISTRATIVE | Facility: HOSPITAL | Age: 31
End: 2021-01-04

## 2021-04-05 ENCOUNTER — PATIENT MESSAGE (OUTPATIENT)
Dept: ADMINISTRATIVE | Facility: HOSPITAL | Age: 31
End: 2021-04-05

## 2021-04-06 ENCOUNTER — OFFICE VISIT (OUTPATIENT)
Dept: PRIMARY CARE CLINIC | Facility: CLINIC | Age: 31
End: 2021-04-06
Payer: MEDICAID

## 2021-04-06 VITALS
SYSTOLIC BLOOD PRESSURE: 106 MMHG | OXYGEN SATURATION: 93 % | HEIGHT: 60 IN | HEART RATE: 68 BPM | DIASTOLIC BLOOD PRESSURE: 78 MMHG | WEIGHT: 119.06 LBS | BODY MASS INDEX: 23.37 KG/M2

## 2021-04-06 DIAGNOSIS — J45.40 MODERATE PERSISTENT ASTHMA, UNSPECIFIED WHETHER COMPLICATED: Primary | ICD-10-CM

## 2021-04-06 DIAGNOSIS — J30.89 ALLERGIC RHINITIS DUE TO OTHER ALLERGIC TRIGGER, UNSPECIFIED SEASONALITY: ICD-10-CM

## 2021-04-06 PROBLEM — J30.9 ALLERGIC RHINITIS: Status: ACTIVE | Noted: 2021-04-06

## 2021-04-06 PROCEDURE — 99999 PR PBB SHADOW E&M-EST. PATIENT-LVL IV: CPT | Mod: PBBFAC,,, | Performed by: FAMILY MEDICINE

## 2021-04-06 PROCEDURE — 99214 OFFICE O/P EST MOD 30 MIN: CPT | Mod: PBBFAC,PN | Performed by: FAMILY MEDICINE

## 2021-04-06 PROCEDURE — 99999 PR PBB SHADOW E&M-EST. PATIENT-LVL IV: ICD-10-PCS | Mod: PBBFAC,,, | Performed by: FAMILY MEDICINE

## 2021-04-06 PROCEDURE — 99214 OFFICE O/P EST MOD 30 MIN: CPT | Mod: S$PBB,,, | Performed by: FAMILY MEDICINE

## 2021-04-06 PROCEDURE — 99214 PR OFFICE/OUTPT VISIT, EST, LEVL IV, 30-39 MIN: ICD-10-PCS | Mod: S$PBB,,, | Performed by: FAMILY MEDICINE

## 2021-04-06 RX ORDER — PREDNISONE 10 MG/1
40 TABLET ORAL DAILY
COMMUNITY
Start: 2021-03-17 | End: 2021-04-28 | Stop reason: ALTCHOICE

## 2021-04-06 RX ORDER — CETIRIZINE HYDROCHLORIDE 10 MG/1
10 TABLET ORAL DAILY
Qty: 90 TABLET | Refills: 3 | Status: SHIPPED | OUTPATIENT
Start: 2021-04-06 | End: 2023-08-10

## 2021-04-06 RX ORDER — ACETAMINOPHEN AND CODEINE PHOSPHATE 300; 30 MG/1; MG/1
1-2 TABLET ORAL EVERY 6 HOURS PRN
COMMUNITY
Start: 2021-03-01 | End: 2021-04-06

## 2021-04-06 RX ORDER — PROMETHAZINE HYDROCHLORIDE AND DEXTROMETHORPHAN HYDROBROMIDE 6.25; 15 MG/5ML; MG/5ML
SYRUP ORAL
COMMUNITY
Start: 2021-03-17 | End: 2021-04-06

## 2021-04-06 RX ORDER — MAG HYDROX/ALUMINUM HYD/SIMETH 200-200-20
30 SUSPENSION, ORAL (FINAL DOSE FORM) ORAL
COMMUNITY
Start: 2021-01-29 | End: 2021-04-06

## 2021-04-06 RX ORDER — 1.1% SODIUM FLUORIDE PRESCRIPTION DENTAL CREAM 5 MG/G
CREAM DENTAL
COMMUNITY
Start: 2021-01-05 | End: 2021-04-06

## 2021-04-06 RX ORDER — MONTELUKAST SODIUM 10 MG/1
10 TABLET ORAL NIGHTLY
Qty: 90 TABLET | Refills: 3 | Status: SHIPPED | OUTPATIENT
Start: 2021-04-06 | End: 2021-05-06

## 2021-04-06 RX ORDER — MEDROXYPROGESTERONE ACETATE 150 MG/ML
INJECTION, SUSPENSION INTRAMUSCULAR
COMMUNITY
Start: 2021-03-10

## 2021-04-06 RX ORDER — FLUTICASONE PROPIONATE 50 MCG
1 SPRAY, SUSPENSION (ML) NASAL DAILY
Qty: 1 G | Refills: 2 | Status: SHIPPED | OUTPATIENT
Start: 2021-04-06 | End: 2023-08-10

## 2021-04-06 RX ORDER — AMOXICILLIN 500 MG/1
500 CAPSULE ORAL 3 TIMES DAILY
COMMUNITY
Start: 2021-03-01 | End: 2021-04-06

## 2021-04-26 ENCOUNTER — PATIENT OUTREACH (OUTPATIENT)
Dept: ADMINISTRATIVE | Facility: OTHER | Age: 31
End: 2021-04-26

## 2021-04-28 ENCOUNTER — OFFICE VISIT (OUTPATIENT)
Dept: ALLERGY | Facility: CLINIC | Age: 31
End: 2021-04-28
Payer: MEDICAID

## 2021-04-28 ENCOUNTER — LAB VISIT (OUTPATIENT)
Dept: LAB | Facility: HOSPITAL | Age: 31
End: 2021-04-28
Payer: MEDICAID

## 2021-04-28 VITALS — HEIGHT: 60 IN | BODY MASS INDEX: 23.67 KG/M2 | WEIGHT: 120.56 LBS

## 2021-04-28 DIAGNOSIS — J30.89 ALLERGIC RHINITIS DUE TO OTHER ALLERGIC TRIGGER, UNSPECIFIED SEASONALITY: ICD-10-CM

## 2021-04-28 DIAGNOSIS — H57.89 PERIORBITAL SWELLING: ICD-10-CM

## 2021-04-28 DIAGNOSIS — J45.40 MODERATE PERSISTENT ASTHMA, UNSPECIFIED WHETHER COMPLICATED: ICD-10-CM

## 2021-04-28 DIAGNOSIS — Z91.018 HISTORY OF FOOD ALLERGY: ICD-10-CM

## 2021-04-28 DIAGNOSIS — J45.40 MODERATE PERSISTENT ASTHMA, UNSPECIFIED WHETHER COMPLICATED: Primary | ICD-10-CM

## 2021-04-28 DIAGNOSIS — Z00.00 HEALTHCARE MAINTENANCE: ICD-10-CM

## 2021-04-28 PROCEDURE — 86003 ALLG SPEC IGE CRUDE XTRC EA: CPT | Mod: 59 | Performed by: STUDENT IN AN ORGANIZED HEALTH CARE EDUCATION/TRAINING PROGRAM

## 2021-04-28 PROCEDURE — 99999 PR PBB SHADOW E&M-EST. PATIENT-LVL III: ICD-10-PCS | Mod: PBBFAC,,, | Performed by: STUDENT IN AN ORGANIZED HEALTH CARE EDUCATION/TRAINING PROGRAM

## 2021-04-28 PROCEDURE — 99999 PR PBB SHADOW E&M-EST. PATIENT-LVL III: CPT | Mod: PBBFAC,,, | Performed by: STUDENT IN AN ORGANIZED HEALTH CARE EDUCATION/TRAINING PROGRAM

## 2021-04-28 PROCEDURE — 99213 OFFICE O/P EST LOW 20 MIN: CPT | Mod: PBBFAC | Performed by: STUDENT IN AN ORGANIZED HEALTH CARE EDUCATION/TRAINING PROGRAM

## 2021-04-28 PROCEDURE — 99204 PR OFFICE/OUTPT VISIT, NEW, LEVL IV, 45-59 MIN: ICD-10-PCS | Mod: S$PBB,,, | Performed by: ALLERGY & IMMUNOLOGY

## 2021-04-28 PROCEDURE — 36415 COLL VENOUS BLD VENIPUNCTURE: CPT | Performed by: STUDENT IN AN ORGANIZED HEALTH CARE EDUCATION/TRAINING PROGRAM

## 2021-04-28 PROCEDURE — 99204 OFFICE O/P NEW MOD 45 MIN: CPT | Mod: S$PBB,,, | Performed by: ALLERGY & IMMUNOLOGY

## 2021-04-28 PROCEDURE — 86003 ALLG SPEC IGE CRUDE XTRC EA: CPT | Performed by: STUDENT IN AN ORGANIZED HEALTH CARE EDUCATION/TRAINING PROGRAM

## 2021-05-04 LAB
A ALTERNATA IGE QN: 27.4 KU/L
A FUMIGATUS IGE QN: 3.55 KU/L
BERMUDA GRASS IGE QN: <0.1 KU/L
CAT DANDER IGE QN: 0.44 KU/L
CEDAR IGE QN: <0.1 KU/L
D FARINAE IGE QN: 66.2 KU/L
D PTERONYSS IGE QN: 87.6 KU/L
DEPRECATED A ALTERNATA IGE RAST QL: ABNORMAL
DEPRECATED A FUMIGATUS IGE RAST QL: ABNORMAL
DEPRECATED BERMUDA GRASS IGE RAST QL: NORMAL
DEPRECATED CAT DANDER IGE RAST QL: ABNORMAL
DEPRECATED CEDAR IGE RAST QL: NORMAL
DEPRECATED D FARINAE IGE RAST QL: ABNORMAL
DEPRECATED D PTERONYSS IGE RAST QL: ABNORMAL
DEPRECATED DOG DANDER IGE RAST QL: ABNORMAL
DEPRECATED ELDER IGE RAST QL: ABNORMAL
DEPRECATED ENGL PLANTAIN IGE RAST QL: NORMAL
DEPRECATED ONION IGE RAST QL: ABNORMAL
DEPRECATED PECAN/HICK TREE IGE RAST QL: NORMAL
DEPRECATED ROACH IGE RAST QL: ABNORMAL
DEPRECATED TIMOTHY IGE RAST QL: ABNORMAL
DEPRECATED WEST RAGWEED IGE RAST QL: ABNORMAL
DEPRECATED WHITE OAK IGE RAST QL: NORMAL
DOG DANDER IGE QN: 4.27 KU/L
ELDER IGE QN: 0.35 KU/L
ENGL PLANTAIN IGE QN: <0.1 KU/L
ONION IGE QN: 0.11 KU/L
PECAN/HICK TREE IGE QN: <0.1 KU/L
ROACH IGE QN: 1.45 KU/L
TIMOTHY IGE QN: 0.25 KU/L
WEST RAGWEED IGE QN: 1.09 KU/L
WHITE OAK IGE QN: <0.1 KU/L

## 2021-05-05 ENCOUNTER — OFFICE VISIT (OUTPATIENT)
Dept: PRIMARY CARE CLINIC | Facility: CLINIC | Age: 31
End: 2021-05-05
Payer: MEDICAID

## 2021-05-05 ENCOUNTER — PATIENT MESSAGE (OUTPATIENT)
Dept: ALLERGY | Facility: CLINIC | Age: 31
End: 2021-05-05

## 2021-05-05 VITALS
WEIGHT: 120.81 LBS | BODY MASS INDEX: 23.72 KG/M2 | HEIGHT: 60 IN | SYSTOLIC BLOOD PRESSURE: 110 MMHG | DIASTOLIC BLOOD PRESSURE: 70 MMHG | TEMPERATURE: 99 F | HEART RATE: 73 BPM | OXYGEN SATURATION: 99 %

## 2021-05-05 DIAGNOSIS — J06.9 UPPER RESPIRATORY TRACT INFECTION, UNSPECIFIED TYPE: Primary | ICD-10-CM

## 2021-05-05 PROCEDURE — 99999 PR PBB SHADOW E&M-EST. PATIENT-LVL IV: ICD-10-PCS | Mod: PBBFAC,,, | Performed by: FAMILY MEDICINE

## 2021-05-05 PROCEDURE — 99999 PR PBB SHADOW E&M-EST. PATIENT-LVL IV: CPT | Mod: PBBFAC,,, | Performed by: FAMILY MEDICINE

## 2021-05-05 PROCEDURE — 99213 OFFICE O/P EST LOW 20 MIN: CPT | Mod: S$PBB,,, | Performed by: FAMILY MEDICINE

## 2021-05-05 PROCEDURE — 99214 OFFICE O/P EST MOD 30 MIN: CPT | Mod: PBBFAC,PN | Performed by: FAMILY MEDICINE

## 2021-05-05 PROCEDURE — 99213 PR OFFICE/OUTPT VISIT, EST, LEVL III, 20-29 MIN: ICD-10-PCS | Mod: S$PBB,,, | Performed by: FAMILY MEDICINE

## 2021-05-05 RX ORDER — AZITHROMYCIN 250 MG/1
250 TABLET, FILM COATED ORAL
COMMUNITY
Start: 2021-05-04 | End: 2023-08-10

## 2021-05-05 RX ORDER — METRONIDAZOLE 500 MG/1
500 TABLET ORAL 2 TIMES DAILY
COMMUNITY
Start: 2021-04-06 | End: 2023-08-10

## 2021-05-11 ENCOUNTER — PATIENT OUTREACH (OUTPATIENT)
Dept: ADMINISTRATIVE | Facility: OTHER | Age: 31
End: 2021-05-11

## 2021-07-13 ENCOUNTER — OFFICE VISIT (OUTPATIENT)
Dept: PRIMARY CARE CLINIC | Facility: CLINIC | Age: 31
End: 2021-07-13
Payer: MEDICAID

## 2021-07-13 VITALS
HEART RATE: 76 BPM | HEIGHT: 60 IN | SYSTOLIC BLOOD PRESSURE: 128 MMHG | DIASTOLIC BLOOD PRESSURE: 80 MMHG | OXYGEN SATURATION: 99 % | BODY MASS INDEX: 23.58 KG/M2 | WEIGHT: 120.13 LBS

## 2021-07-13 DIAGNOSIS — R82.90 ABNORMAL URINALYSIS: ICD-10-CM

## 2021-07-13 DIAGNOSIS — J45.40 MODERATE PERSISTENT ASTHMA, UNSPECIFIED WHETHER COMPLICATED: Primary | ICD-10-CM

## 2021-07-13 PROCEDURE — 99213 PR OFFICE/OUTPT VISIT, EST, LEVL III, 20-29 MIN: ICD-10-PCS | Mod: S$PBB,,, | Performed by: FAMILY MEDICINE

## 2021-07-13 PROCEDURE — 99213 OFFICE O/P EST LOW 20 MIN: CPT | Mod: S$PBB,,, | Performed by: FAMILY MEDICINE

## 2021-07-13 PROCEDURE — 99999 PR PBB SHADOW E&M-EST. PATIENT-LVL IV: ICD-10-PCS | Mod: PBBFAC,,, | Performed by: FAMILY MEDICINE

## 2021-07-13 PROCEDURE — 99214 OFFICE O/P EST MOD 30 MIN: CPT | Mod: PBBFAC,PN | Performed by: FAMILY MEDICINE

## 2021-07-13 PROCEDURE — 99999 PR PBB SHADOW E&M-EST. PATIENT-LVL IV: CPT | Mod: PBBFAC,,, | Performed by: FAMILY MEDICINE

## 2021-07-13 RX ORDER — PREDNISONE 20 MG/1
60 TABLET ORAL
COMMUNITY
Start: 2021-07-10 | End: 2021-07-15

## 2021-07-13 RX ORDER — AMOXICILLIN AND CLAVULANATE POTASSIUM 875; 125 MG/1; MG/1
TABLET, FILM COATED ORAL
COMMUNITY
Start: 2021-07-10 | End: 2021-07-13

## 2021-07-13 RX ORDER — GUAIFENESIN 100 MG/5ML
200 SOLUTION ORAL
COMMUNITY
Start: 2021-07-10 | End: 2021-07-15

## 2021-07-13 RX ORDER — PROMETHAZINE HYDROCHLORIDE AND DEXTROMETHORPHAN HYDROBROMIDE 6.25; 15 MG/5ML; MG/5ML
5 SYRUP ORAL
COMMUNITY
Start: 2021-05-04 | End: 2021-11-29

## 2021-09-08 ENCOUNTER — HOSPITAL ENCOUNTER (OUTPATIENT)
Dept: PULMONOLOGY | Facility: CLINIC | Age: 31
Discharge: HOME | End: 2021-09-08
Payer: MEDICAID

## 2021-09-08 DIAGNOSIS — J45.40 MODERATE PERSISTENT ASTHMA: Primary | ICD-10-CM

## 2021-09-08 DIAGNOSIS — J45.40 MODERATE PERSISTENT ASTHMA, UNSPECIFIED WHETHER COMPLICATED: ICD-10-CM

## 2021-09-08 LAB
FEF 25 75 LLN: 1.58
FEF 25 75 PRE REF: 96.4 %
FEF 25 75 REF: 2.77
FET100 CHG: 1.7 %
FEV05 LLN: 1.16
FEV05 REF: 2.02
FEV1 CHG: 3.5 %
FEV1 FVC LLN: 74
FEV1 FVC PRE REF: 96.9 %
FEV1 FVC REF: 85
FEV1 LLN: 1.84
FEV1 PRE REF: 97.7 %
FEV1 REF: 2.36
FEV1 VOL CHG: 0.08
FVC CHG: -3.1 %
FVC LLN: 2.18
FVC PRE REF: 100.7 %
FVC REF: 2.78
FVC VOL CHG: -0.09
PEF LLN: 4.39
PEF PRE REF: 88.8 %
PEF REF: 6.14
PHYSICIAN COMMENT: NORMAL
POST FEF 25 75: 3.82 L/S (ref 1.58–3.97)
POST FET 100: 6.54 SEC
POST FEV1 FVC: 88.18 % (ref 74.06–96.31)
POST FEV1: 2.39 L (ref 1.84–2.88)
POST FEV5: 2.07 L (ref 1.16–2.88)
POST FVC: 2.71 L (ref 2.18–3.38)
POST PEF: 6.53 L/S (ref 4.39–7.89)
PRE FEF 25 75: 2.67 L/S (ref 1.58–3.97)
PRE FET 100: 6.42 SEC
PRE FEV05 REF: 94 %
PRE FEV1 FVC: 82.58 % (ref 74.06–96.31)
PRE FEV1: 2.31 L (ref 1.84–2.88)
PRE FEV5: 1.9 L (ref 1.16–2.88)
PRE FVC: 2.79 L (ref 2.18–3.38)
PRE PEF: 5.45 L/S (ref 4.39–7.89)
SARS-COV-2 RDRP RESP QL NAA+PROBE: NEGATIVE

## 2021-09-08 PROCEDURE — 94060 PR EVAL OF BRONCHOSPASM: ICD-10-PCS | Mod: 26,S$PBB,, | Performed by: INTERNAL MEDICINE

## 2021-09-08 PROCEDURE — 94060 EVALUATION OF WHEEZING: CPT | Mod: 26,S$PBB,, | Performed by: INTERNAL MEDICINE

## 2021-09-08 PROCEDURE — U0002 COVID-19 LAB TEST NON-CDC: HCPCS | Performed by: STUDENT IN AN ORGANIZED HEALTH CARE EDUCATION/TRAINING PROGRAM

## 2021-09-08 PROCEDURE — 94060 EVALUATION OF WHEEZING: CPT | Mod: PBBFAC | Performed by: INTERNAL MEDICINE

## 2021-10-13 ENCOUNTER — PATIENT MESSAGE (OUTPATIENT)
Dept: ALLERGY | Facility: CLINIC | Age: 31
End: 2021-10-13
Payer: MEDICAID

## 2021-10-25 ENCOUNTER — PATIENT OUTREACH (OUTPATIENT)
Dept: ADMINISTRATIVE | Facility: OTHER | Age: 31
End: 2021-10-25
Payer: MEDICAID

## 2021-11-05 ENCOUNTER — IMMUNIZATION (OUTPATIENT)
Dept: OBSTETRICS AND GYNECOLOGY | Facility: CLINIC | Age: 31
End: 2021-11-05
Payer: MEDICAID

## 2021-11-05 DIAGNOSIS — Z23 NEED FOR VACCINATION: Primary | ICD-10-CM

## 2021-11-05 PROCEDURE — 0001A COVID-19, MRNA, LNP-S, PF, 30 MCG/0.3 ML DOSE VACCINE: CPT | Mod: PBBFAC

## 2021-11-26 ENCOUNTER — IMMUNIZATION (OUTPATIENT)
Dept: PRIMARY CARE CLINIC | Facility: CLINIC | Age: 31
End: 2021-11-26
Payer: MEDICAID

## 2021-11-26 DIAGNOSIS — Z23 NEED FOR VACCINATION: Primary | ICD-10-CM

## 2021-11-26 PROCEDURE — 91300 COVID-19, MRNA, LNP-S, PF, 30 MCG/0.3 ML DOSE VACCINE: CPT | Mod: PBBFAC | Performed by: INTERNAL MEDICINE

## 2021-11-26 PROCEDURE — 0002A COVID-19, MRNA, LNP-S, PF, 30 MCG/0.3 ML DOSE VACCINE: CPT | Mod: CV19,PBBFAC | Performed by: INTERNAL MEDICINE

## 2021-11-29 ENCOUNTER — OFFICE VISIT (OUTPATIENT)
Dept: PRIMARY CARE CLINIC | Facility: CLINIC | Age: 31
End: 2021-11-29
Payer: MEDICAID

## 2021-11-29 DIAGNOSIS — R11.2 NON-INTRACTABLE VOMITING WITH NAUSEA, UNSPECIFIED VOMITING TYPE: ICD-10-CM

## 2021-11-29 DIAGNOSIS — R51.9 NONINTRACTABLE EPISODIC HEADACHE, UNSPECIFIED HEADACHE TYPE: Primary | ICD-10-CM

## 2021-11-29 PROCEDURE — 99212 PR OFFICE/OUTPT VISIT, EST, LEVL II, 10-19 MIN: ICD-10-PCS | Mod: 95,,, | Performed by: NURSE PRACTITIONER

## 2021-11-29 PROCEDURE — 99212 OFFICE O/P EST SF 10 MIN: CPT | Mod: 95,,, | Performed by: NURSE PRACTITIONER

## 2021-11-29 RX ORDER — ONDANSETRON 4 MG/1
4 TABLET, ORALLY DISINTEGRATING ORAL EVERY 6 HOURS PRN
Qty: 20 TABLET | Refills: 0 | Status: SHIPPED | OUTPATIENT
Start: 2021-11-29 | End: 2023-08-10

## 2021-11-29 RX ORDER — ACETAMINOPHEN 500 MG
1000 TABLET ORAL EVERY 8 HOURS PRN
Qty: 30 TABLET | Refills: 0 | Status: SHIPPED | OUTPATIENT
Start: 2021-11-29

## 2022-07-02 NOTE — PROGRESS NOTES
Subjective:       Patient ID: Jennyfer Bobby is a 32 y.o. female.    Chief Complaint: Follow-up and Back Pain    Ms. Jennyfer Bobby is a 32 year old female, new to me, presents to the clinic with elevated YESY level from dermatology appointment . PCP is Aden Drew. Medical and surgical history in addition to problem list reviewed as listed below.    Patient presents with concerns about elevated YESY levels obtained from a recent Dermatology appointment at [facility] patient states she has been experiencing muscle and joint pain along with muscle spasms for the past two weeks. She states she has intermittent generalized back pain. Patient describes pain as tightening, rate kamryn as a seven out of ten. OTC Tylenol with minimal relief.      Past Medical History:   Diagnosis Date    Anemia     Asthma     Hiatal hernia     Hiatal hernia with GERD     Hx of migraines         History reviewed. No pertinent surgical history.     Family History   Problem Relation Age of Onset    Cancer Mother     Alcohol abuse Father     Drug abuse Father     Hypertension Maternal Grandmother     Stroke Maternal Grandmother        Social History     Tobacco Use   Smoking Status Never Smoker   Smokeless Tobacco Never Used       Social History     Social History Narrative    ** Merged History Encounter **            Review of patient's allergies indicates:   Allergen Reactions    Naproxen Nausea And Vomiting        Review of Systems   Constitutional: Negative for chills, fatigue and fever.   HENT: Negative.    Respiratory: Negative.    Cardiovascular: Positive for chest pain.   Gastrointestinal: Negative.  Negative for constipation, diarrhea, nausea and vomiting.   Genitourinary: Negative.    Musculoskeletal: Positive for arthralgias and back pain.   Neurological: Negative.          Objective:        Vitals:    07/06/22 1517   BP: (!) 147/92   Pulse: 73   Temp: 98.4 °F (36.9 °C)        Physical Exam  Constitutional:        General: She is not in acute distress.     Appearance: She is well-developed.   HENT:      Head: Normocephalic and atraumatic.      Right Ear: External ear normal.      Left Ear: External ear normal.   Eyes:      General: No scleral icterus.     Extraocular Movements: Extraocular movements intact.      Conjunctiva/sclera: Conjunctivae normal.   Cardiovascular:      Rate and Rhythm: Normal rate and regular rhythm.      Heart sounds: Normal heart sounds. No murmur heard.    No friction rub. No gallop.   Pulmonary:      Effort: Pulmonary effort is normal.      Breath sounds: Normal breath sounds. No wheezing or rales.   Musculoskeletal:         General: Normal range of motion.      Right shoulder: Tenderness present.      Left shoulder: Tenderness present.      Cervical back: Neck supple. Pain with movement and muscular tenderness present.   Lymphadenopathy:      Cervical: No cervical adenopathy.   Skin:     General: Skin is warm and dry.      Findings: No erythema or rash.   Neurological:      Mental Status: She is alert and oriented to person, place, and time.      Cranial Nerves: No cranial nerve deficit.   Psychiatric:         Mood and Affect: Mood normal.         Behavior: Behavior normal.         Assessment:       1. Muscle pain    2. Arthralgia, unspecified joint    3. Back pain, unspecified back location, unspecified back pain laterality, unspecified chronicity    4. Back muscle spasm    5. Need for hepatitis C screening test    6. Preventative health care    7. Screening for HIV (human immunodeficiency virus)    8. Screen for sexually transmitted diseases    9. Need for diphtheria-tetanus-pertussis (Tdap) vaccine    10. Elevated antinuclear antibody (YESY) level    11. Screening for thyroid disorder        Plan:       Muscle pain  Rule Out Systemic lupus erythematosus, Sjögren's syndrome  Anti DNA, Double Stranded; Future; Expected date: 07/11/2022  -     ANTI-SMITH ANTIBODY; Future; Expected date:  07/11/2022  -     Sjogren's syndrome-A extractable nuclear antibody; Future; Expected date: 07/11/2022  -     ANTI-SSB ANTIBODY; Future; Expected date: 07/11/2022  -     ANTI SM/RNP ANTIBODY; Future; Expected date: 07/11/2022    Arthralgia, unspecified joint  -     ANTI-DNA ANTIBODY, DOUBLE-STRANDED; Future; Expected date: 07/11/2022  -     ANTI-SMITH ANTIBODY; Future; Expected date: 07/11/2022  -     Sjogren's syndrome-A extractable nuclear antibody; Future; Expected date: 07/11/2022  -     ANTI-SSB ANTIBODY; Future; Expected date: 07/11/2022  -     ANTI SM/RNP ANTIBODY; Future; Expected date: 07/11/2022    Back pain, unspecified back location, unspecified back pain laterality, unspecified chronicity    Back muscle spasm  -     tizanidine (ZANAFLEX) 4 MG tablet; Take 1 tablet (4 mg total) by mouth every 6 (six) hours as needed (pain/spasms/muscle and joint pain).  Dispense: 8 tablet; Refill: 0    Need for hepatitis C screening test  -     Hepatitis C Antibody; Future; Expected date: 07/06/2022    Preventative health care  -     Lipid Panel; Future; Expected date: 07/06/2022  -     Comprehensive Metabolic Panel; Future; Expected date: 07/06/2022  -     CBC Auto Differential; Future; Expected date: 07/06/2022  -     Urinalysis, Reflex to Urine Culture Urine, Clean Catch    Screening for HIV (human immunodeficiency virus)  -     HIV 1/2 Ag/Ab (4th Gen); Future; Expected date: 07/06/2022    Screen for sexually transmitted diseases  -     RPR; Future; Expected date: 07/06/2022    Need for diphtheria-tetanus-pertussis (Tdap) vaccine  -     (In Office Administered) Tdap Vaccine    Elevated antinuclear antibody (YESY) level  Comments:  Reported and referred to PCP from encounter with [facility] one week ago.  Orders:  -     YESY Screen w/Reflex; Future; Expected date: 07/06/2022    Screening for thyroid disorder  -     TSH; Future; Expected date: 07/06/2022  -     T4, Free; Future; Expected date: 07/06/2022    Other  orders  -     Urinalysis Microscopic       Medication List with Changes/Refills   New Medications    TIZANIDINE (ZANAFLEX) 4 MG TABLET    Take 1 tablet (4 mg total) by mouth every 6 (six) hours as needed (pain/spasms/muscle and joint pain).   Current Medications    ACETAMINOPHEN (TYLENOL) 500 MG TABLET    Take 2 tablets (1,000 mg total) by mouth every 8 (eight) hours as needed for Pain.    ALBUTEROL (PROVENTIL/VENTOLIN HFA) 90 MCG/ACTUATION INHALER    Inhale 2 puffs into the lungs every 4 (four) hours as needed for Wheezing or Shortness of Breath. Rescue    AZITHROMYCIN (Z-AUNG) 250 MG TABLET    Take 250 mg by mouth.    CETIRIZINE (ZYRTEC) 10 MG TABLET    Take 1 tablet (10 mg total) by mouth once daily.    FLUTICASONE PROPIONATE (FLONASE) 50 MCG/ACTUATION NASAL SPRAY    1 spray (50 mcg total) by Each Nostril route once daily.    MEDROXYPROGESTERONE (DEPO-PROVERA) 150 MG/ML SYRG    BRING PREFILLED SYRINGE TO OFFICE FOR ADMINISTRATION EVERY THREE MONTHS    METRONIDAZOLE (FLAGYL) 500 MG TABLET    Take 500 mg by mouth 2 (two) times daily.    MOMETASONE-FORMOTEROL (DULERA) 100-5 MCG/ACTUATION HFAA    Inhale 2 puffs into the lungs 2 (two) times a day. Controller    ONDANSETRON (ZOFRAN-ODT) 4 MG TBDL    Take 1 tablet (4 mg total) by mouth every 6 (six) hours as needed (nausea or vomiting).            Follow up if symptoms worsen or fail to improve.    Jelly Hamilton, APRN, MSN, FNP-C

## 2022-07-06 ENCOUNTER — OFFICE VISIT (OUTPATIENT)
Dept: PRIMARY CARE CLINIC | Facility: CLINIC | Age: 32
End: 2022-07-06
Payer: MEDICAID

## 2022-07-06 ENCOUNTER — LAB VISIT (OUTPATIENT)
Dept: PRIMARY CARE CLINIC | Facility: CLINIC | Age: 32
End: 2022-07-06
Payer: MEDICAID

## 2022-07-06 VITALS
HEIGHT: 60 IN | WEIGHT: 128.5 LBS | TEMPERATURE: 98 F | HEART RATE: 73 BPM | DIASTOLIC BLOOD PRESSURE: 92 MMHG | BODY MASS INDEX: 25.23 KG/M2 | OXYGEN SATURATION: 100 % | SYSTOLIC BLOOD PRESSURE: 147 MMHG

## 2022-07-06 DIAGNOSIS — Z11.4 SCREENING FOR HIV (HUMAN IMMUNODEFICIENCY VIRUS): ICD-10-CM

## 2022-07-06 DIAGNOSIS — M62.830 BACK MUSCLE SPASM: ICD-10-CM

## 2022-07-06 DIAGNOSIS — R76.8 ELEVATED ANTINUCLEAR ANTIBODY (ANA) LEVEL: ICD-10-CM

## 2022-07-06 DIAGNOSIS — Z11.3 SCREEN FOR SEXUALLY TRANSMITTED DISEASES: ICD-10-CM

## 2022-07-06 DIAGNOSIS — Z23 NEED FOR DIPHTHERIA-TETANUS-PERTUSSIS (TDAP) VACCINE: ICD-10-CM

## 2022-07-06 DIAGNOSIS — Z13.29 SCREENING FOR THYROID DISORDER: ICD-10-CM

## 2022-07-06 DIAGNOSIS — Z11.59 NEED FOR HEPATITIS C SCREENING TEST: ICD-10-CM

## 2022-07-06 DIAGNOSIS — M25.50 ARTHRALGIA, UNSPECIFIED JOINT: ICD-10-CM

## 2022-07-06 DIAGNOSIS — M54.9 BACK PAIN, UNSPECIFIED BACK LOCATION, UNSPECIFIED BACK PAIN LATERALITY, UNSPECIFIED CHRONICITY: ICD-10-CM

## 2022-07-06 DIAGNOSIS — Z87.898 HISTORY OF ELEVATED ANTINUCLEAR ANTIBODY (ANA): ICD-10-CM

## 2022-07-06 DIAGNOSIS — Z00.00 PREVENTATIVE HEALTH CARE: ICD-10-CM

## 2022-07-06 DIAGNOSIS — M79.10 MUSCLE PAIN: Primary | ICD-10-CM

## 2022-07-06 LAB
ALBUMIN SERPL BCP-MCNC: 3.8 G/DL (ref 3.5–5.2)
ALP SERPL-CCNC: 73 U/L (ref 55–135)
ALT SERPL W/O P-5'-P-CCNC: 10 U/L (ref 10–44)
ANION GAP SERPL CALC-SCNC: 9 MMOL/L (ref 8–16)
AST SERPL-CCNC: 18 U/L (ref 10–40)
BASOPHILS # BLD AUTO: 0.04 K/UL (ref 0–0.2)
BASOPHILS NFR BLD: 0.4 % (ref 0–1.9)
BILIRUB SERPL-MCNC: 0.3 MG/DL (ref 0.1–1)
BILIRUB UR QL STRIP: NEGATIVE
BUN SERPL-MCNC: 7 MG/DL (ref 6–20)
CALCIUM SERPL-MCNC: 9.4 MG/DL (ref 8.7–10.5)
CHLORIDE SERPL-SCNC: 105 MMOL/L (ref 95–110)
CLARITY UR REFRACT.AUTO: CLEAR
CO2 SERPL-SCNC: 25 MMOL/L (ref 23–29)
COLOR UR AUTO: YELLOW
CREAT SERPL-MCNC: 0.8 MG/DL (ref 0.5–1.4)
DIFFERENTIAL METHOD: NORMAL
EOSINOPHIL # BLD AUTO: 0.3 K/UL (ref 0–0.5)
EOSINOPHIL NFR BLD: 3.2 % (ref 0–8)
ERYTHROCYTE [DISTWIDTH] IN BLOOD BY AUTOMATED COUNT: 13.9 % (ref 11.5–14.5)
EST. GFR  (AFRICAN AMERICAN): >60 ML/MIN/1.73 M^2
EST. GFR  (NON AFRICAN AMERICAN): >60 ML/MIN/1.73 M^2
GLUCOSE SERPL-MCNC: 77 MG/DL (ref 70–110)
GLUCOSE UR QL STRIP: NEGATIVE
HCT VFR BLD AUTO: 38.1 % (ref 37–48.5)
HGB BLD-MCNC: 12.3 G/DL (ref 12–16)
HGB UR QL STRIP: ABNORMAL
IMM GRANULOCYTES # BLD AUTO: 0.04 K/UL (ref 0–0.04)
IMM GRANULOCYTES NFR BLD AUTO: 0.4 % (ref 0–0.5)
KETONES UR QL STRIP: NEGATIVE
LEUKOCYTE ESTERASE UR QL STRIP: NEGATIVE
LYMPHOCYTES # BLD AUTO: 2.1 K/UL (ref 1–4.8)
LYMPHOCYTES NFR BLD: 21 % (ref 18–48)
MCH RBC QN AUTO: 29.2 PG (ref 27–31)
MCHC RBC AUTO-ENTMCNC: 32.3 G/DL (ref 32–36)
MCV RBC AUTO: 91 FL (ref 82–98)
MICROSCOPIC COMMENT: NORMAL
MONOCYTES # BLD AUTO: 0.6 K/UL (ref 0.3–1)
MONOCYTES NFR BLD: 6.2 % (ref 4–15)
NEUTROPHILS # BLD AUTO: 7 K/UL (ref 1.8–7.7)
NEUTROPHILS NFR BLD: 68.8 % (ref 38–73)
NITRITE UR QL STRIP: NEGATIVE
NRBC BLD-RTO: 0 /100 WBC
PH UR STRIP: 7 [PH] (ref 5–8)
PLATELET # BLD AUTO: 375 K/UL (ref 150–450)
PMV BLD AUTO: 11.5 FL (ref 9.2–12.9)
POTASSIUM SERPL-SCNC: 3.5 MMOL/L (ref 3.5–5.1)
PROT SERPL-MCNC: 7.7 G/DL (ref 6–8.4)
PROT UR QL STRIP: NEGATIVE
RBC # BLD AUTO: 4.21 M/UL (ref 4–5.4)
RBC #/AREA URNS AUTO: 0 /HPF (ref 0–4)
SODIUM SERPL-SCNC: 139 MMOL/L (ref 136–145)
SP GR UR STRIP: 1.01 (ref 1–1.03)
SQUAMOUS #/AREA URNS AUTO: 1 /HPF
T4 FREE SERPL-MCNC: 0.97 NG/DL (ref 0.71–1.51)
TSH SERPL DL<=0.005 MIU/L-ACNC: 1.33 UIU/ML (ref 0.4–4)
URN SPEC COLLECT METH UR: ABNORMAL
WBC # BLD AUTO: 10.12 K/UL (ref 3.9–12.7)
WBC #/AREA URNS AUTO: 0 /HPF (ref 0–5)

## 2022-07-06 PROCEDURE — 3008F BODY MASS INDEX DOCD: CPT | Mod: CPTII,,, | Performed by: NURSE PRACTITIONER

## 2022-07-06 PROCEDURE — 99214 OFFICE O/P EST MOD 30 MIN: CPT | Mod: S$PBB,,, | Performed by: NURSE PRACTITIONER

## 2022-07-06 PROCEDURE — 36415 COLL VENOUS BLD VENIPUNCTURE: CPT | Performed by: NURSE PRACTITIONER

## 2022-07-06 PROCEDURE — 3077F PR MOST RECENT SYSTOLIC BLOOD PRESSURE >= 140 MM HG: ICD-10-PCS | Mod: CPTII,,, | Performed by: NURSE PRACTITIONER

## 2022-07-06 PROCEDURE — 81001 URINALYSIS AUTO W/SCOPE: CPT | Performed by: NURSE PRACTITIONER

## 2022-07-06 PROCEDURE — 3080F DIAST BP >= 90 MM HG: CPT | Mod: CPTII,,, | Performed by: NURSE PRACTITIONER

## 2022-07-06 PROCEDURE — 80053 COMPREHEN METABOLIC PANEL: CPT | Performed by: NURSE PRACTITIONER

## 2022-07-06 PROCEDURE — 86038 ANTINUCLEAR ANTIBODIES: CPT | Performed by: NURSE PRACTITIONER

## 2022-07-06 PROCEDURE — 86235 NUCLEAR ANTIGEN ANTIBODY: CPT | Performed by: NURSE PRACTITIONER

## 2022-07-06 PROCEDURE — 3008F PR BODY MASS INDEX (BMI) DOCUMENTED: ICD-10-PCS | Mod: CPTII,,, | Performed by: NURSE PRACTITIONER

## 2022-07-06 PROCEDURE — 99214 OFFICE O/P EST MOD 30 MIN: CPT | Mod: PBBFAC,PN | Performed by: NURSE PRACTITIONER

## 2022-07-06 PROCEDURE — 1160F PR REVIEW ALL MEDS BY PRESCRIBER/CLIN PHARMACIST DOCUMENTED: ICD-10-PCS | Mod: CPTII,,, | Performed by: NURSE PRACTITIONER

## 2022-07-06 PROCEDURE — 87389 HIV-1 AG W/HIV-1&-2 AB AG IA: CPT | Performed by: NURSE PRACTITIONER

## 2022-07-06 PROCEDURE — 84443 ASSAY THYROID STIM HORMONE: CPT | Performed by: NURSE PRACTITIONER

## 2022-07-06 PROCEDURE — 86803 HEPATITIS C AB TEST: CPT | Performed by: NURSE PRACTITIONER

## 2022-07-06 PROCEDURE — 99999 PR PBB SHADOW E&M-EST. PATIENT-LVL IV: CPT | Mod: PBBFAC,,, | Performed by: NURSE PRACTITIONER

## 2022-07-06 PROCEDURE — 86592 SYPHILIS TEST NON-TREP QUAL: CPT | Performed by: NURSE PRACTITIONER

## 2022-07-06 PROCEDURE — 36415 COLL VENOUS BLD VENIPUNCTURE: CPT | Mod: PBBFAC,PN

## 2022-07-06 PROCEDURE — 85025 COMPLETE CBC W/AUTO DIFF WBC: CPT | Performed by: NURSE PRACTITIONER

## 2022-07-06 PROCEDURE — 1159F PR MEDICATION LIST DOCUMENTED IN MEDICAL RECORD: ICD-10-PCS | Mod: CPTII,,, | Performed by: NURSE PRACTITIONER

## 2022-07-06 PROCEDURE — 3077F SYST BP >= 140 MM HG: CPT | Mod: CPTII,,, | Performed by: NURSE PRACTITIONER

## 2022-07-06 PROCEDURE — 86039 ANTINUCLEAR ANTIBODIES (ANA): CPT | Performed by: NURSE PRACTITIONER

## 2022-07-06 PROCEDURE — 1159F MED LIST DOCD IN RCRD: CPT | Mod: CPTII,,, | Performed by: NURSE PRACTITIONER

## 2022-07-06 PROCEDURE — 84439 ASSAY OF FREE THYROXINE: CPT | Performed by: NURSE PRACTITIONER

## 2022-07-06 PROCEDURE — 90715 TDAP VACCINE 7 YRS/> IM: CPT | Mod: PBBFAC,PN

## 2022-07-06 PROCEDURE — 99214 PR OFFICE/OUTPT VISIT, EST, LEVL IV, 30-39 MIN: ICD-10-PCS | Mod: S$PBB,,, | Performed by: NURSE PRACTITIONER

## 2022-07-06 PROCEDURE — 99999 PR PBB SHADOW E&M-EST. PATIENT-LVL IV: ICD-10-PCS | Mod: PBBFAC,,, | Performed by: NURSE PRACTITIONER

## 2022-07-06 PROCEDURE — 1160F RVW MEDS BY RX/DR IN RCRD: CPT | Mod: CPTII,,, | Performed by: NURSE PRACTITIONER

## 2022-07-06 PROCEDURE — 3080F PR MOST RECENT DIASTOLIC BLOOD PRESSURE >= 90 MM HG: ICD-10-PCS | Mod: CPTII,,, | Performed by: NURSE PRACTITIONER

## 2022-07-06 RX ORDER — TIZANIDINE 4 MG/1
4 TABLET ORAL EVERY 6 HOURS PRN
Qty: 8 TABLET | Refills: 0 | Status: SHIPPED | OUTPATIENT
Start: 2022-07-06 | End: 2022-07-14

## 2022-07-07 ENCOUNTER — PATIENT MESSAGE (OUTPATIENT)
Dept: PRIMARY CARE CLINIC | Facility: CLINIC | Age: 32
End: 2022-07-07
Payer: MEDICAID

## 2022-07-07 LAB
ANA PATTERN 1: NORMAL
ANA PATTERN 2: NORMAL
ANA SER QL IF: POSITIVE
ANA TITER 2: NORMAL
ANA TITR SER IF: NORMAL {TITER}
HCV AB SERPL QL IA: NEGATIVE
HIV 1+2 AB+HIV1 P24 AG SERPL QL IA: NEGATIVE
RPR SER QL: NORMAL

## 2022-07-09 LAB
ANTI SM ANTIBODY: 0.14 RATIO (ref 0–0.99)
ANTI SM/RNP ANTIBODY: 0.08 RATIO (ref 0–0.99)
ANTI-SM INTERPRETATION: NEGATIVE
ANTI-SM/RNP INTERPRETATION: NEGATIVE
ANTI-SSA ANTIBODY: 0.06 RATIO (ref 0–0.99)
ANTI-SSA INTERPRETATION: NEGATIVE
ANTI-SSB ANTIBODY: 0.05 RATIO (ref 0–0.99)
ANTI-SSB INTERPRETATION: NEGATIVE
DSDNA AB SER-ACNC: NORMAL [IU]/ML

## 2022-07-11 ENCOUNTER — PATIENT MESSAGE (OUTPATIENT)
Dept: PRIMARY CARE CLINIC | Facility: CLINIC | Age: 32
End: 2022-07-11
Payer: MEDICAID

## 2022-07-11 PROBLEM — M79.10 MUSCLE PAIN: Status: ACTIVE | Noted: 2022-07-11

## 2022-07-11 PROBLEM — M25.50 ARTHRALGIA: Status: ACTIVE | Noted: 2022-07-11

## 2022-07-12 ENCOUNTER — LAB VISIT (OUTPATIENT)
Dept: PRIMARY CARE CLINIC | Facility: CLINIC | Age: 32
End: 2022-07-12
Payer: MEDICAID

## 2022-07-12 DIAGNOSIS — Z00.00 PREVENTATIVE HEALTH CARE: ICD-10-CM

## 2022-07-12 DIAGNOSIS — M79.10 MUSCLE PAIN: ICD-10-CM

## 2022-07-12 DIAGNOSIS — M25.50 ARTHRALGIA, UNSPECIFIED JOINT: ICD-10-CM

## 2022-07-12 LAB
CHOLEST SERPL-MCNC: 130 MG/DL (ref 120–199)
CHOLEST/HDLC SERPL: 2.7 {RATIO} (ref 2–5)
HDLC SERPL-MCNC: 48 MG/DL (ref 40–75)
HDLC SERPL: 36.9 % (ref 20–50)
LDLC SERPL CALC-MCNC: 70.6 MG/DL (ref 63–159)
NONHDLC SERPL-MCNC: 82 MG/DL
TRIGL SERPL-MCNC: 57 MG/DL (ref 30–150)

## 2022-07-12 PROCEDURE — 86225 DNA ANTIBODY NATIVE: CPT | Performed by: NURSE PRACTITIONER

## 2022-07-12 PROCEDURE — 86235 NUCLEAR ANTIGEN ANTIBODY: CPT | Mod: 59 | Performed by: NURSE PRACTITIONER

## 2022-07-12 PROCEDURE — 86235 NUCLEAR ANTIGEN ANTIBODY: CPT | Performed by: NURSE PRACTITIONER

## 2022-07-12 PROCEDURE — 80061 LIPID PANEL: CPT | Performed by: NURSE PRACTITIONER

## 2022-07-12 PROCEDURE — 36415 COLL VENOUS BLD VENIPUNCTURE: CPT | Performed by: NURSE PRACTITIONER

## 2022-07-12 PROCEDURE — 36415 COLL VENOUS BLD VENIPUNCTURE: CPT | Mod: PBBFAC,PN

## 2022-07-13 LAB — DSDNA AB SER-ACNC: NORMAL [IU]/ML

## 2022-07-14 LAB
ANTI SM ANTIBODY: 0.1 RATIO (ref 0–0.99)
ANTI SM/RNP ANTIBODY: 0.07 RATIO (ref 0–0.99)
ANTI-SM INTERPRETATION: NEGATIVE
ANTI-SM/RNP INTERPRETATION: NEGATIVE
ANTI-SSA ANTIBODY: 0.05 RATIO (ref 0–0.99)
ANTI-SSA INTERPRETATION: NEGATIVE
ANTI-SSB ANTIBODY: 0.06 RATIO (ref 0–0.99)
ANTI-SSB INTERPRETATION: NEGATIVE

## 2022-10-24 ENCOUNTER — HOSPITAL ENCOUNTER (EMERGENCY)
Facility: HOSPITAL | Age: 32
Discharge: HOME | End: 2022-10-24
Attending: EMERGENCY MEDICINE
Payer: MEDICAID

## 2022-10-24 VITALS
RESPIRATION RATE: 18 BRPM | SYSTOLIC BLOOD PRESSURE: 130 MMHG | WEIGHT: 128 LBS | TEMPERATURE: 99 F | OXYGEN SATURATION: 99 % | HEART RATE: 71 BPM | BODY MASS INDEX: 25 KG/M2 | DIASTOLIC BLOOD PRESSURE: 84 MMHG

## 2022-10-24 DIAGNOSIS — T78.40XA ALLERGY, INITIAL ENCOUNTER: Primary | ICD-10-CM

## 2022-10-24 LAB
B-HCG UR QL: NEGATIVE
CTP QC/QA: YES

## 2022-10-24 PROCEDURE — 81025 URINE PREGNANCY TEST: CPT

## 2022-10-24 PROCEDURE — 99283 EMERGENCY DEPT VISIT LOW MDM: CPT

## 2022-10-24 RX ORDER — PREDNISONE 20 MG/1
40 TABLET ORAL DAILY
Qty: 10 TABLET | Refills: 0 | Status: SHIPPED | OUTPATIENT
Start: 2022-10-24 | End: 2022-10-29

## 2022-10-24 RX ORDER — DIPHENHYDRAMINE HCL 25 MG
25 CAPSULE ORAL EVERY 6 HOURS PRN
Qty: 30 CAPSULE | Refills: 0 | Status: SHIPPED | OUTPATIENT
Start: 2022-10-24

## 2022-10-24 NOTE — ED PROVIDER NOTES
Encounter Date: 10/24/2022       History     Chief Complaint   Patient presents with    Nasal Congestion     Nasal congestion & rash to left under eye since yesterday     32-year-old female with past medical history of allergies presents to ED complaining of bilateral eye itching, facial rash, nasal congestion, and rhinorrhea that started a few days ago.  Patient states her symptoms are similar to her history of allergies in the past.  She reports getting these symptoms every spring.  Patient has been taking Benadryl with mild relief.  Patient is on the Depo shot.  Patient denies any fever, chills, chest pain, shortness of breath, trouble swallowing, trouble breathing, abdominal pain, nausea, vomiting, diarrhea.  No other symptoms reported.    The history is provided by the patient. No  was used.   Review of patient's allergies indicates:   Allergen Reactions    Naproxen Nausea And Vomiting     Past Medical History:   Diagnosis Date    Anemia     Asthma     Hiatal hernia     Hiatal hernia with GERD     Hx of migraines      History reviewed. No pertinent surgical history.  Family History   Problem Relation Age of Onset    Cancer Mother     Alcohol abuse Father     Drug abuse Father     Hypertension Maternal Grandmother     Stroke Maternal Grandmother      Social History     Tobacco Use    Smoking status: Never    Smokeless tobacco: Never   Substance Use Topics    Alcohol use: Yes     Comment: occassionally    Drug use: Never     Review of Systems   Constitutional:  Negative for chills and fever.   HENT:  Positive for congestion and rhinorrhea. Negative for ear pain and sore throat.    Eyes:  Negative for redness.        (+) bilateral eye itching   Respiratory:  Negative for cough and shortness of breath.    Cardiovascular:  Negative for chest pain.   Gastrointestinal:  Negative for abdominal pain, diarrhea, nausea and vomiting.   Genitourinary:  Negative for decreased urine volume, difficulty  urinating, dysuria, frequency, hematuria and urgency.   Musculoskeletal:  Negative for back pain and neck pain.   Skin:  Positive for rash.   Neurological:  Negative for headaches.   Psychiatric/Behavioral:  Negative for confusion.      Physical Exam     Initial Vitals [10/24/22 1602]   BP Pulse Resp Temp SpO2   (!) 145/86 78 18 98.8 °F (37.1 °C) 100 %      MAP       --         Physical Exam    Nursing note and vitals reviewed.  Constitutional: She appears well-developed and well-nourished. She is not diaphoretic.  Non-toxic appearance. She does not appear ill. No distress.   HENT:   Head: Normocephalic and atraumatic.   Right Ear: Hearing, tympanic membrane, external ear and ear canal normal. Tympanic membrane is not perforated, not erythematous and not bulging.   Left Ear: Hearing, tympanic membrane, external ear and ear canal normal. Tympanic membrane is not perforated, not erythematous and not bulging.   Nose: Nose normal.   Mouth/Throat: Uvula is midline, oropharynx is clear and moist and mucous membranes are normal.   Erythematous papular rash to patient's bilateral cheeks.   Eyes: Conjunctivae and EOM are normal.   Neck: Neck supple.   Normal range of motion.   Full passive range of motion without pain.     Cardiovascular:  Normal rate and regular rhythm.           Pulses:       Radial pulses are 2+ on the right side and 2+ on the left side.   Pulmonary/Chest: Effort normal and breath sounds normal. No respiratory distress.   Abdominal: Abdomen is soft. Bowel sounds are normal. She exhibits no distension. There is no abdominal tenderness. There is no rebound and no guarding.   Musculoskeletal:         General: Normal range of motion.      Cervical back: Full passive range of motion without pain, normal range of motion and neck supple. No rigidity.     Neurological: She is alert.   Skin: Skin is warm and dry. No rash noted.   Psychiatric: She has a normal mood and affect.       ED Course   Procedures  Labs  Reviewed   POCT URINE PREGNANCY          Imaging Results    None          Medications - No data to display  Medical Decision Making:   Clinical Tests:   Lab Tests: Ordered and Reviewed  ED Management:  This is a 32-year-old female with past medical history of allergies presents to ED complaining of bilateral eye itching, facial rash, nasal congestion, and rhinorrhea that started a few days ago. On physical exam, patient is well-appearing and in no acute distress.  Nontoxic appearing.  Lungs are clear to auscultation bilaterally.  Abdomen is soft and nontender.  No guarding, rigidity, rebound.  2+ radial pulses bilaterally.  Posterior oropharynx is not erythematous.  No edema or exudate.  Uvula midline.  Bilateral tympanic membrane is normal.  No erythema, bulging, or perforations. Erythematous papular rash to patient's bilateral cheeks.  UPT negative.  Will discharge patient on Benadryl and short course of prednisone.  Urged prompt follow-up with PCP for further evaluation.    Strict return precautions given. I discussed with the patient/family the diagnosis, treatment plan, indications for return to the emergency department, and for expected follow-up. The patient/family verbalized an understanding. The patient/family is asked if there are any questions or concerns. We discuss the case, until all issues are addressed to the patient/family's satisfaction. Patient/family understands and is agreeable to the plan. Patient is stable and ready for discharge.                          Clinical Impression:   Final diagnoses:  [T78.40XA] Allergy, initial encounter (Primary)        ED Disposition Condition    Discharge Stable          ED Prescriptions       Medication Sig Dispense Start Date End Date Auth. Provider    predniSONE (DELTASONE) 20 MG tablet Take 2 tablets (40 mg total) by mouth once daily. for 5 days 10 tablet 10/24/2022 10/29/2022 Izabel Mota PA-C    diphenhydrAMINE (BENADRYL) 25 mg capsule Take 1 capsule (25  mg total) by mouth every 6 (six) hours as needed for Itching or Allergies. 30 capsule 10/24/2022 -- Izabel Mota PA-C          Follow-up Information       Follow up With Specialties Details Why Contact Info    Aden Drew MD Family Medicine In 2 days for further evaluation [id number]  [facility] [address]  [phone]      [facility] Emergency Dept Emergency Medicine In 2 days If symptoms worsen [address]  [facility] [address] [phone]             Izabel Mota PA-C  10/24/22 2961

## 2022-10-24 NOTE — DISCHARGE INSTRUCTIONS

## 2023-02-24 ENCOUNTER — PATIENT OUTREACH (OUTPATIENT)
Dept: ADMINISTRATIVE | Facility: HOSPITAL | Age: 33
End: 2023-02-24
Payer: MEDICAID

## 2023-02-24 ENCOUNTER — TELEPHONE (OUTPATIENT)
Dept: PRIMARY CARE CLINIC | Facility: CLINIC | Age: 33
End: 2023-02-24
Payer: MEDICAID

## 2023-02-24 NOTE — TELEPHONE ENCOUNTER
----- Message from Vivien Clark sent at 2/24/2023 12:01 PM CST -----  Contact: [phone]  Pt is calling for an appt due to ack pain please advise

## 2023-04-11 ENCOUNTER — PATIENT MESSAGE (OUTPATIENT)
Dept: RESEARCH | Facility: HOSPITAL | Age: 33
End: 2023-04-11
Payer: MEDICAID

## 2023-08-10 ENCOUNTER — OFFICE VISIT (OUTPATIENT)
Dept: PRIMARY CARE CLINIC | Facility: CLINIC | Age: 33
End: 2023-08-10
Payer: MEDICAID

## 2023-08-10 VITALS
HEIGHT: 60 IN | WEIGHT: 128.75 LBS | SYSTOLIC BLOOD PRESSURE: 120 MMHG | DIASTOLIC BLOOD PRESSURE: 90 MMHG | HEART RATE: 65 BPM | OXYGEN SATURATION: 98 % | BODY MASS INDEX: 25.28 KG/M2

## 2023-08-10 DIAGNOSIS — R76.8 POSITIVE ANA (ANTINUCLEAR ANTIBODY): ICD-10-CM

## 2023-08-10 DIAGNOSIS — Z00.00 ANNUAL PHYSICAL EXAM: ICD-10-CM

## 2023-08-10 DIAGNOSIS — M54.50 ACUTE LEFT-SIDED LOW BACK PAIN WITHOUT SCIATICA: ICD-10-CM

## 2023-08-10 DIAGNOSIS — M62.830 BACK MUSCLE SPASM: ICD-10-CM

## 2023-08-10 DIAGNOSIS — J30.9 ALLERGIC RHINITIS, UNSPECIFIED SEASONALITY, UNSPECIFIED TRIGGER: Primary | ICD-10-CM

## 2023-08-10 PROCEDURE — 3008F BODY MASS INDEX DOCD: CPT | Mod: CPTII,,, | Performed by: FAMILY MEDICINE

## 2023-08-10 PROCEDURE — 3080F DIAST BP >= 90 MM HG: CPT | Mod: CPTII,,, | Performed by: FAMILY MEDICINE

## 2023-08-10 PROCEDURE — 1160F PR REVIEW ALL MEDS BY PRESCRIBER/CLIN PHARMACIST DOCUMENTED: ICD-10-PCS | Mod: CPTII,,, | Performed by: FAMILY MEDICINE

## 2023-08-10 PROCEDURE — 99999 PR PBB SHADOW E&M-EST. PATIENT-LVL III: ICD-10-PCS | Mod: PBBFAC,,, | Performed by: FAMILY MEDICINE

## 2023-08-10 PROCEDURE — 3080F PR MOST RECENT DIASTOLIC BLOOD PRESSURE >= 90 MM HG: ICD-10-PCS | Mod: CPTII,,, | Performed by: FAMILY MEDICINE

## 2023-08-10 PROCEDURE — 1159F PR MEDICATION LIST DOCUMENTED IN MEDICAL RECORD: ICD-10-PCS | Mod: CPTII,,, | Performed by: FAMILY MEDICINE

## 2023-08-10 PROCEDURE — 99999 PR PBB SHADOW E&M-EST. PATIENT-LVL III: CPT | Mod: PBBFAC,,, | Performed by: FAMILY MEDICINE

## 2023-08-10 PROCEDURE — 3008F PR BODY MASS INDEX (BMI) DOCUMENTED: ICD-10-PCS | Mod: CPTII,,, | Performed by: FAMILY MEDICINE

## 2023-08-10 PROCEDURE — 1159F MED LIST DOCD IN RCRD: CPT | Mod: CPTII,,, | Performed by: FAMILY MEDICINE

## 2023-08-10 PROCEDURE — 99214 PR OFFICE/OUTPT VISIT, EST, LEVL IV, 30-39 MIN: ICD-10-PCS | Mod: S$PBB,,, | Performed by: FAMILY MEDICINE

## 2023-08-10 PROCEDURE — 1160F RVW MEDS BY RX/DR IN RCRD: CPT | Mod: CPTII,,, | Performed by: FAMILY MEDICINE

## 2023-08-10 PROCEDURE — 99999PBSHW PR PBB SHADOW TECHNICAL ONLY FILED TO HB: Mod: PBBFAC,,,

## 2023-08-10 PROCEDURE — 3074F PR MOST RECENT SYSTOLIC BLOOD PRESSURE < 130 MM HG: ICD-10-PCS | Mod: CPTII,,, | Performed by: FAMILY MEDICINE

## 2023-08-10 PROCEDURE — 99214 OFFICE O/P EST MOD 30 MIN: CPT | Mod: S$PBB,,, | Performed by: FAMILY MEDICINE

## 2023-08-10 PROCEDURE — 96372 THER/PROPH/DIAG INJ SC/IM: CPT | Mod: PBBFAC,PN

## 2023-08-10 PROCEDURE — 99213 OFFICE O/P EST LOW 20 MIN: CPT | Mod: 25,PBBFAC,PN | Performed by: FAMILY MEDICINE

## 2023-08-10 PROCEDURE — 99999PBSHW PR PBB SHADOW TECHNICAL ONLY FILED TO HB: ICD-10-PCS | Mod: PBBFAC,,,

## 2023-08-10 PROCEDURE — 3074F SYST BP LT 130 MM HG: CPT | Mod: CPTII,,, | Performed by: FAMILY MEDICINE

## 2023-08-10 RX ORDER — TIZANIDINE 4 MG/1
TABLET ORAL
Qty: 30 TABLET | Refills: 2 | Status: SHIPPED | OUTPATIENT
Start: 2023-08-10

## 2023-08-10 RX ORDER — KETOROLAC TROMETHAMINE 30 MG/ML
15 INJECTION, SOLUTION INTRAMUSCULAR; INTRAVENOUS
Status: DISCONTINUED | OUTPATIENT
Start: 2023-08-10 | End: 2023-08-10

## 2023-08-10 RX ORDER — KETOROLAC TROMETHAMINE 30 MG/ML
30 INJECTION, SOLUTION INTRAMUSCULAR; INTRAVENOUS
Status: COMPLETED | OUTPATIENT
Start: 2023-08-10 | End: 2023-08-10

## 2023-08-10 RX ORDER — IPRATROPIUM BROMIDE 21 UG/1
2 SPRAY, METERED NASAL 2 TIMES DAILY
Qty: 30 ML | Refills: 2 | Status: SHIPPED | OUTPATIENT
Start: 2023-08-10

## 2023-08-10 RX ORDER — AZELASTINE 1 MG/ML
1 SPRAY, METERED NASAL 2 TIMES DAILY
Qty: 30 ML | Refills: 2 | Status: SHIPPED | OUTPATIENT
Start: 2023-08-10 | End: 2024-08-09

## 2023-08-10 RX ORDER — KETOROLAC TROMETHAMINE 30 MG/ML
30 INJECTION, SOLUTION INTRAMUSCULAR; INTRAVENOUS
Status: DISCONTINUED | OUTPATIENT
Start: 2023-08-10 | End: 2023-08-10

## 2023-08-10 RX ORDER — DICLOFENAC SODIUM 10 MG/G
2 GEL TOPICAL 4 TIMES DAILY
Qty: 100 G | Refills: 1 | Status: SHIPPED | OUTPATIENT
Start: 2023-08-10

## 2023-08-10 RX ADMIN — KETOROLAC TROMETHAMINE 30 MG: 30 INJECTION, SOLUTION INTRAMUSCULAR; INTRAVENOUS at 11:08
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
## 2023-08-24 ENCOUNTER — CLINICAL SUPPORT (OUTPATIENT)
Dept: PRIMARY CARE CLINIC | Facility: CLINIC | Age: 33
End: 2023-08-24
Payer: MEDICAID

## 2023-08-24 VITALS — HEART RATE: 68 BPM | SYSTOLIC BLOOD PRESSURE: 130 MMHG | OXYGEN SATURATION: 98 % | DIASTOLIC BLOOD PRESSURE: 84 MMHG

## 2023-08-24 DIAGNOSIS — R03.0 ELEVATED BLOOD PRESSURE READING WITHOUT DIAGNOSIS OF HYPERTENSION: Primary | ICD-10-CM

## 2023-08-24 PROCEDURE — 99999 PR PBB SHADOW E&M-EST. PATIENT-LVL II: CPT | Mod: PBBFAC,,,

## 2023-08-24 PROCEDURE — 99999 PR PBB SHADOW E&M-EST. PATIENT-LVL II: ICD-10-PCS | Mod: PBBFAC,,,

## 2023-08-24 PROCEDURE — 99212 OFFICE O/P EST SF 10 MIN: CPT | Mod: PBBFAC,PN

## 2023-08-24 NOTE — PROGRESS NOTES
Jennyfer Vazquez Kanu 33 y.o. female is here today for Blood Pressure check.   History of HTN no.    Review of patient's allergies indicates:   Allergen Reactions    Naproxen Nausea And Vomiting     Creatinine   Date Value Ref Range Status   08/10/2023 0.8 0.5 - 1.4 mg/dL Final     Sodium   Date Value Ref Range Status   08/10/2023 139 136 - 145 mmol/L Final     Potassium   Date Value Ref Range Status   08/10/2023 3.9 3.5 - 5.1 mmol/L Final   ]  Patient denies taking blood pressure medications on a regular basis at the same time of the day.     Current Outpatient Medications:     acetaminophen (TYLENOL) 500 MG tablet, Take 2 tablets (1,000 mg total) by mouth every 8 (eight) hours as needed for Pain., Disp: 30 tablet, Rfl: 0    albuterol (PROVENTIL/VENTOLIN HFA) 90 mcg/actuation inhaler, Inhale 2 puffs into the lungs every 4 (four) hours as needed for Wheezing or Shortness of Breath. Rescue, Disp: 18 g, Rfl: 5    azelastine (ASTELIN) 137 mcg (0.1 %) nasal spray, 1 spray (137 mcg total) by Nasal route 2 (two) times daily., Disp: 30 mL, Rfl: 2    diclofenac sodium (VOLTAREN) 1 % Gel, Apply 2 g topically 4 (four) times daily., Disp: 100 g, Rfl: 1    diphenhydrAMINE (BENADRYL) 25 mg capsule, Take 1 capsule (25 mg total) by mouth every 6 (six) hours as needed for Itching or Allergies., Disp: 30 capsule, Rfl: 0    ipratropium (ATROVENT) 21 mcg (0.03 %) nasal spray, 2 sprays by Each Nostril route 2 (two) times daily., Disp: 30 mL, Rfl: 2    medroxyPROGESTERone (DEPO-PROVERA) 150 mg/mL Syrg, BRING PREFILLED SYRINGE TO OFFICE FOR ADMINISTRATION EVERY THREE MONTHS, Disp: , Rfl:     tiZANidine (ZANAFLEX) 4 MG tablet, TAKE 1 TABLET(4 MG) BY MOUTH EVERY 6 HOURS AS NEEDED FOR PAIN OR SPASMS OR JOINT PAIN, Disp: 30 tablet, Rfl: 2  Does patient have record of home blood pressure readings no. .   Last dose of blood pressure medication was taken at n/a.  Patient is asymptomatic.   Complains of none.    Vitals:    08/24/23 0922   BP:  130/84   Pulse: 68   SpO2: 98%         Dr. Drew informed of nurse visit.

## 2023-11-30 NOTE — LETTER
[facility] [address] [address]  Phone: [phone]  Fax: [phone]  [facility]: [email or web address]    Pt Name: Jennyfer Bobby  Injury Date: 07/02/2020    Date of First Treatment: 10/09/2020   Company: [employer]      Appointment Time: 01:45 PM Arrived: 145pm   Provider: Joseline Shearer NP Time Out:225pm     Office Treatment:   1. Tendinitis of right rotator cuff    2. Decreased range of motion of right shoulder    3. Muscle weakness of right upper extremity    4. Work related injury          Patient Instructions: Continue Physical Therapy    Restrictions: Regular Duty     Return Appointment: 10.30.2020 at 2pm          yes

## 2023-12-12 ENCOUNTER — NURSE TRIAGE (OUTPATIENT)
Dept: ADMINISTRATIVE | Facility: CLINIC | Age: 33
End: 2023-12-12
Payer: MEDICAID

## 2023-12-12 NOTE — TELEPHONE ENCOUNTER
OOC RN  Patient c/o Headache used to take medicine for migraine and does not having those any more. 8/10 painn x 3 days. Does not remember the name.   Care advise go to the ED.    Reason for Disposition   SEVERE headache, states 'worst headache' of life    Additional Information   Negative: Difficult to awaken or acting confused (e.g., disoriented, slurred speech)   Negative: Weakness of the face, arm or leg on one side of the body and new-onset   Negative: Numbness of the face, arm or leg on one side of the body and new-onset   Negative: Loss of speech or garbled speech and new-onset   Negative: Passed out (i.e., fainted, collapsed and was not responding)   Negative: Sounds like a life-threatening emergency to the triager   Negative: Unable to walk without falling   Negative: Stiff neck (can't touch chin to chest)   Negative: Possibility of carbon monoxide exposure    Protocols used: Headache-A-OH

## 2024-01-03 VITALS
OXYGEN SATURATION: 100 % | RESPIRATION RATE: 16 BRPM | HEART RATE: 82 BPM | WEIGHT: 132 LBS | BODY MASS INDEX: 25.91 KG/M2 | DIASTOLIC BLOOD PRESSURE: 86 MMHG | SYSTOLIC BLOOD PRESSURE: 142 MMHG | HEIGHT: 60 IN | TEMPERATURE: 98 F

## 2024-01-03 LAB
B-HCG UR QL: NEGATIVE
CTP QC/QA: YES
MOLECULAR STREP A: NEGATIVE
POC MOLECULAR INFLUENZA A AGN: NEGATIVE
POC MOLECULAR INFLUENZA B AGN: NEGATIVE
SARS-COV-2 RDRP RESP QL NAA+PROBE: NEGATIVE

## 2024-01-03 PROCEDURE — 81025 URINE PREGNANCY TEST: CPT | Performed by: NURSE PRACTITIONER

## 2024-01-03 PROCEDURE — 93005 ELECTROCARDIOGRAM TRACING: CPT

## 2024-01-03 PROCEDURE — 87635 SARS-COV-2 COVID-19 AMP PRB: CPT | Performed by: NURSE PRACTITIONER

## 2024-01-03 PROCEDURE — 93010 ELECTROCARDIOGRAM REPORT: CPT | Mod: ,,, | Performed by: INTERNAL MEDICINE

## 2024-01-03 PROCEDURE — 99900041 HC LEFT WITHOUT BEING SEEN- EMERGENCY

## 2024-01-04 ENCOUNTER — HOSPITAL ENCOUNTER (EMERGENCY)
Facility: HOSPITAL | Age: 34
Discharge: LEFT BEFORE DISCHARGE | End: 2024-01-04
Payer: MEDICAID

## 2024-01-04 DIAGNOSIS — R06.02 SHORTNESS OF BREATH: ICD-10-CM

## 2024-01-04 DIAGNOSIS — R06.02 SOB (SHORTNESS OF BREATH): ICD-10-CM

## 2024-01-04 NOTE — ED NOTES
Swabs collected and running.    Patient provided specimen collection cup and made aware of need for urine sample. Understanding verbalized. Patient will alert nursing staff when sample able to be provided.

## 2024-04-30 ENCOUNTER — E-VISIT (OUTPATIENT)
Dept: PRIMARY CARE CLINIC | Facility: CLINIC | Age: 34
End: 2024-04-30
Payer: MEDICAID

## 2024-04-30 DIAGNOSIS — N93.9 ABNORMAL UTERINE BLEEDING (AUB): Primary | ICD-10-CM

## 2024-05-01 PROCEDURE — 99499 UNLISTED E&M SERVICE: CPT | Mod: ,,, | Performed by: FAMILY MEDICINE

## 2024-05-01 NOTE — PROGRESS NOTES
Patient ID: Jennyfer Bobby is a 33 y.o. female.    Chief Complaint: Vaginal Discharge (Entered automatically based on patient selection in Patient Portal.)    The patient initiated a request through [employer] on 4/30/2024 for evaluation and management with a chief complaint of Vaginal Discharge (Entered automatically based on patient selection in Patient Portal.)     I evaluated the questionnaire submission on 5/1/24.    [facility] Peq Evisit Vaginal Discharge    4/30/2024  5:47 PM CDT - Filed by Patient   Do you agree to participate in an E-Visit? Yes   If you have any of the following symptoms,  please do not complete an E-Visit,  schedule an appointment with your provider: I acknowledge   What is the main issue you would like addressed today? I got off depo last year and now Im receiving my cycle but Ive been bleeding for about a month now and its heavy and clotting. Is that normal? Or am I losing to much blood? Ive gotten off before and I didnt bleed like this.   Are you able to take your vital signs? No   Are you pregnant, could you be pregnant, or are you breast feeding? None of the above   Which of the following are you experiencing? Vaginal bleeding    Are you having pain while passing urine? No, I have no pain while urinating.   Which of the following applies to your vaginal discharge? I have no discharge.    Which of the following are you experiencing? Lower back pain   Do you have any sores on your genitals? No    Have you taken antibiotics recently? I have not been on any antibiotics    Do you use any of the following? None of the above   Which of the following applies to your menstrual period? I am having a menstrual period now.   Have you had similar symptoms in the past? No, I have never had these symptoms.   Have you had a fever? No   During the last 2 months, have you had sexual contact with a specific person for the first time? Yes   Has a person with whom you have had sexual contact been  recently told they have a disease possibly acquired through sex? No   Provide any additional information you feel is important.    Please attach any relevant images or files          No diagnosis found.     No orders of the defined types were placed in this encounter.           No follow-ups on file.      E-Visit Time Trackin min

## 2024-05-03 ENCOUNTER — OFFICE VISIT (OUTPATIENT)
Dept: PRIMARY CARE CLINIC | Facility: CLINIC | Age: 34
End: 2024-05-03
Payer: MEDICAID

## 2024-05-03 DIAGNOSIS — N93.9 ABNORMAL UTERINE BLEEDING (AUB): Primary | ICD-10-CM

## 2024-05-03 PROCEDURE — 99213 OFFICE O/P EST LOW 20 MIN: CPT | Mod: 95,,, | Performed by: FAMILY MEDICINE

## 2024-05-03 RX ORDER — IBUPROFEN 600 MG/1
600 TABLET ORAL EVERY 6 HOURS PRN
Qty: 60 TABLET | Refills: 3 | Status: SHIPPED | OUTPATIENT
Start: 2024-05-03

## 2024-05-03 RX ORDER — TRANEXAMIC ACID 650 MG/1
1300 TABLET ORAL 3 TIMES DAILY
Qty: 30 TABLET | Refills: 1 | Status: SHIPPED | OUTPATIENT
Start: 2024-05-03 | End: 2024-05-08

## 2024-05-03 NOTE — PROGRESS NOTES
The patient location is: [place]  The chief complaint leading to consultation is: AUB    Visit type: audiovisual    Face to Face time with patient: 15 minutes of total time spent on the encounter, which includes face to face time and non-face to face time preparing to see the patient (eg, review of tests), Obtaining and/or reviewing separately obtained history, Documenting clinical information in the electronic or other health record, Independently interpreting results (not separately reported) and communicating results to the patient/family/caregiver, or Care coordination (not separately reported).         Each patient to whom he or she provides medical services by telemedicine is:  (1) informed of the relationship between the physician and patient and the respective role of any other health care provider with respect to management of the patient; and (2) notified that he or she may decline to receive medical services by telemedicine and may withdraw from such care at any time.    Notes:       Clinic Note  5/3/2024      Subjective:       Patient ID:  Jennyfer is a 33 y.o. female being seen for an established visit.    Chief Complaint: vaginal bleeding    Heavy vaginal bleeding - patient reports being on Depo-Provera over the last 4.5 years, has been off of Depo-Provera since 2023.  Did not have a menstrual cycle up until the end of March 2024, has been bleeding daily for a month.  Reports that she would have very heavy periods where she would have blood clots.  Just started taking ibuprofen 600 mg q.i.d. over last 2 days with improvement of vaginal bleeding.  Last CBC in August 2023 with hemoglobin of 12.8        Review of Systems   Constitutional:  Negative for chills, fever, malaise/fatigue and weight loss.   HENT:  Negative for congestion, sinus pain and sore throat.    Respiratory:  Negative for cough, shortness of breath and wheezing.    Cardiovascular:  Negative for chest pain and palpitations.   Gastrointestinal:   Negative for constipation, diarrhea, nausea and vomiting.   Genitourinary:  Negative for dysuria, frequency and urgency.   Musculoskeletal:  Negative for myalgias.   Skin:  Negative for rash.   Neurological:  Negative for headaches.       Medication List with Changes/Refills   New Medications    IBUPROFEN (ADVIL,MOTRIN) 600 MG TABLET    Take 1 tablet (600 mg total) by mouth every 6 (six) hours as needed for Pain (heavy bleeding (take with food)).    TRANEXAMIC ACID (LYSTEDA) 650 MG TABLET    Take 2 tablets (1,300 mg total) by mouth 3 (three) times daily. for 5 days   Current Medications    ACETAMINOPHEN (TYLENOL) 500 MG TABLET    Take 2 tablets (1,000 mg total) by mouth every 8 (eight) hours as needed for Pain.    ALBUTEROL (PROVENTIL/VENTOLIN HFA) 90 MCG/ACTUATION INHALER    Inhale 2 puffs into the lungs every 4 (four) hours as needed for Wheezing or Shortness of Breath. Rescue    AZELASTINE (ASTELIN) 137 MCG (0.1 %) NASAL SPRAY    1 spray (137 mcg total) by Nasal route 2 (two) times daily.    DICLOFENAC SODIUM (VOLTAREN) 1 % GEL    Apply 2 g topically 4 (four) times daily.    DIPHENHYDRAMINE (BENADRYL) 25 MG CAPSULE    Take 1 capsule (25 mg total) by mouth every 6 (six) hours as needed for Itching or Allergies.    IPRATROPIUM (ATROVENT) 21 MCG (0.03 %) NASAL SPRAY    2 sprays by Each Nostril route 2 (two) times daily.    MEDROXYPROGESTERONE (DEPO-PROVERA) 150 MG/ML SYRG    BRING PREFILLED SYRINGE TO OFFICE FOR ADMINISTRATION EVERY THREE MONTHS    TIZANIDINE (ZANAFLEX) 4 MG TABLET    TAKE 1 TABLET(4 MG) BY MOUTH EVERY 6 HOURS AS NEEDED FOR PAIN OR SPASMS OR JOINT PAIN       Patient Active Problem List   Diagnosis    Acute pain of right shoulder    Muscle weakness of right upper extremity    Decreased range of motion of right shoulder    Allergic rhinitis    Moderate persistent asthma    Muscle pain    Arthralgia           Objective:      There were no vitals taken for this visit.  Estimated body mass index is 25.78  kg/m² as calculated from the following:    Height as of 1/3/24: 5' (1.524 m).    Weight as of 1/3/24: 59.9 kg (132 lb).  Physical Exam  Constitutional:       General: She is not in acute distress.     Appearance: She is not diaphoretic.   HENT:      Head: Normocephalic and atraumatic.   Eyes:      Conjunctiva/sclera: Conjunctivae normal.   Pulmonary:      Effort: Pulmonary effort is normal.   Musculoskeletal:         General: Normal range of motion.   Neurological:      Mental Status: She is alert and oriented to person, place, and time.   Psychiatric:         Mood and Affect: Mood and affect normal.         Behavior: Behavior normal.         Thought Content: Thought content normal.         Cognition and Memory: Memory normal.         Judgment: Judgment normal.           Assessment and Plan:     1. Abnormal uterine bleeding (AUB)  - patient with symptoms of abnormal uterine bleeding after getting off Depo, discussed with patient this is within the realm of normal after getting off Depo.  May take months before menstruation starts to become regular again.  Overall symptoms are improving with NSAIDs, can continue ibuprofen.  If heavy menstrual cycle occurs again in the future, start the ibuprofen and add tranexamic acid if needed  - ibuprofen (ADVIL,MOTRIN) 600 MG tablet; Take 1 tablet (600 mg total) by mouth every 6 (six) hours as needed for Pain (heavy bleeding (take with food)).  Dispense: 60 tablet; Refill: 3  - tranexamic acid (LYSTEDA) 650 mg tablet; Take 2 tablets (1,300 mg total) by mouth 3 (three) times daily. for 5 days  Dispense: 30 tablet; Refill: 1      Follow Up:   No follow-ups on file.    Other Orders Placed This Visit:  No orders of the defined types were placed in this encounter.        Aden Drew MD        This note is dictated on M*Modal word recognition program.  There are word recognition mistakes that are occasionally missed on review.

## 2024-10-08 ENCOUNTER — PATIENT OUTREACH (OUTPATIENT)
Dept: ADMINISTRATIVE | Facility: HOSPITAL | Age: 34
End: 2024-10-08
Payer: MEDICAID

## 2024-10-08 NOTE — PROGRESS NOTES
Health Maintenance Due   Topic Date Due    Influenza Vaccine (1) 09/01/2024    COVID-19 Vaccine (3 - 2024-25 season) 09/01/2024    Mailed reminder Flu injection.

## 2024-10-08 NOTE — LETTER
October 8, 2024    Jennyfer Bobby  [address]  [address] [place] [id number]             Dear Jennyfer    In addition to helping you feel better when you are sick, we are interested in preventing illness and injury in the first place.  Screening tests and routine follow up tests when you have certain medical problems are very essential in helping you stay as healthy as possible. In the spirit of maintaining your health, our system indicates that you are due for the following:    Health Maintenance Due   Topic Date Due    Influenza Vaccine (1) 09/01/2024    COVID-19 Vaccine (3 - 2024-25 season) 09/01/2024        Please be prepared to discuss these recommended tests at your next visit.  If you haven't had a visit within the past one year please call [phone] to schedule or request an appointment.    Sincerely,       Your Health Care Team

## 2024-11-19 NOTE — ED TRIAGE NOTES
Pt. Reports she has a rash on her face due to allergies. Pt. Reports this is recurrent. Pt. Reports she usually takes benadryl however has not taken none. Pt. Reports area is itching.   
No

## 2025-03-25 ENCOUNTER — CLINICAL SUPPORT (OUTPATIENT)
Dept: OTHER | Facility: CLINIC | Age: 35
End: 2025-03-25

## 2025-03-25 DIAGNOSIS — Z00.8 ENCOUNTER FOR OTHER GENERAL EXAMINATION: ICD-10-CM

## 2025-03-26 VITALS
DIASTOLIC BLOOD PRESSURE: 89 MMHG | SYSTOLIC BLOOD PRESSURE: 132 MMHG | HEIGHT: 60 IN | BODY MASS INDEX: 25.91 KG/M2 | WEIGHT: 132 LBS

## 2025-03-26 LAB
GLUCOSE SERPL-MCNC: 114 MG/DL (ref 60–140)
HDLC SERPL-MCNC: 75 MG/DL
POC CHOLESTEROL, LDL (DOCK): 59 MG/DL
POC CHOLESTEROL, TOTAL: 164 MG/DL
TRIGL SERPL-MCNC: 185 MG/DL

## 2025-03-28 ENCOUNTER — RESULTS FOLLOW-UP (OUTPATIENT)
Dept: OTHER | Facility: CLINIC | Age: 35
End: 2025-03-28

## 2025-07-02 ENCOUNTER — RESULTS FOLLOW-UP (OUTPATIENT)
Dept: PRIMARY CARE CLINIC | Facility: CLINIC | Age: 35
End: 2025-07-02

## 2025-07-02 ENCOUNTER — OFFICE VISIT (OUTPATIENT)
Dept: PRIMARY CARE CLINIC | Facility: CLINIC | Age: 35
End: 2025-07-02
Payer: MEDICAID

## 2025-07-02 VITALS
TEMPERATURE: 97 F | DIASTOLIC BLOOD PRESSURE: 102 MMHG | WEIGHT: 130.94 LBS | BODY MASS INDEX: 25.58 KG/M2 | OXYGEN SATURATION: 99 % | SYSTOLIC BLOOD PRESSURE: 178 MMHG | RESPIRATION RATE: 20 BRPM | HEART RATE: 62 BPM

## 2025-07-02 DIAGNOSIS — Z87.59 HISTORY OF MISCARRIAGE: ICD-10-CM

## 2025-07-02 DIAGNOSIS — Z00.00 ANNUAL PHYSICAL EXAM: Primary | ICD-10-CM

## 2025-07-02 DIAGNOSIS — A53.9 SYPHILIS: ICD-10-CM

## 2025-07-02 DIAGNOSIS — R74.8 HIGH SERUM TREPONEMA-SPECIFIC ANTIBODY TITER: Primary | ICD-10-CM

## 2025-07-02 DIAGNOSIS — Z11.3 SCREEN FOR STD (SEXUALLY TRANSMITTED DISEASE): ICD-10-CM

## 2025-07-02 PROCEDURE — 99999 PR PBB SHADOW E&M-EST. PATIENT-LVL III: CPT | Mod: PBBFAC,,, | Performed by: FAMILY MEDICINE

## 2025-07-02 PROCEDURE — 99213 OFFICE O/P EST LOW 20 MIN: CPT | Mod: PBBFAC,PN | Performed by: FAMILY MEDICINE

## 2025-07-02 NOTE — PROGRESS NOTES
Clinic Note  7/2/2025      Subjective:       Patient ID:  Jennyfer is a 35 y.o. female being seen for:      History of Present Illness    CHIEF COMPLAINT:  Jennyfer presents today for a general check up.    HYPERTENSION:  She reports current blood pressure reading of 178/102 with recent home measurement of 154/106 accompanied by severe headaches. She has a family history of hypertension in her aunt. She expresses concern about potential need for blood pressure medication.    OBSTETRIC AND GYNECOLOGIC HISTORY:  She has had four total pregnancies, with three completed and one miscarriage in May at an early stage. Prior to the miscarriage, she had regular monthly menstrual cycles but now reports irregular menstruation with abnormal flow patterns. She experienced significant abdominal pain and loss of appetite prior to the miscarriage. At age 35, she reports feeling emotionally distressed and continues to experience grief related to the pregnancy loss.    CONTRACEPTION:  She is currently off Depo-Provera and plans to restart. She is interested in tubal ligation, which was previously denied by her prior OB/GYN due to age and health status. She has switched providers and plans to discuss tubal ligation again with her new physician.      ROS:  General: -fever, -chills, -fatigue, -weight gain, -weight loss  Eyes: -vision changes, -redness, -discharge  ENT: -ear pain, -nasal congestion, -sore throat  Cardiovascular: -chest pain, -palpitations, -lower extremity edema  Respiratory: -cough, -shortness of breath  Gastrointestinal: +abdominal pain, -nausea, -vomiting, -diarrhea, -constipation, -blood in stool  Genitourinary: -dysuria, -hematuria, -frequency  Musculoskeletal: -joint pain, -muscle pain  Skin: -rash, -lesion  Neurological: +headache, -dizziness, -numbness, -tingling  Psychiatric: -anxiety, -depression, -sleep difficulty  Female Genitourinary: +abnormal menses, +absent or irregular periods           Medication List with  Changes/Refills   Current Medications    ACETAMINOPHEN (TYLENOL) 500 MG TABLET    Take 2 tablets (1,000 mg total) by mouth every 8 (eight) hours as needed for Pain.    ALBUTEROL (PROVENTIL/VENTOLIN HFA) 90 MCG/ACTUATION INHALER    Inhale 2 puffs into the lungs every 4 (four) hours as needed for Wheezing or Shortness of Breath. Rescue    AZELASTINE (ASTELIN) 137 MCG (0.1 %) NASAL SPRAY    1 spray (137 mcg total) by Nasal route 2 (two) times daily.    DICLOFENAC SODIUM (VOLTAREN) 1 % GEL    Apply 2 g topically 4 (four) times daily.    DIPHENHYDRAMINE (BENADRYL) 25 MG CAPSULE    Take 1 capsule (25 mg total) by mouth every 6 (six) hours as needed for Itching or Allergies.    IBUPROFEN (ADVIL,MOTRIN) 600 MG TABLET    Take 1 tablet (600 mg total) by mouth every 6 (six) hours as needed for Pain (heavy bleeding (take with food)).    IPRATROPIUM (ATROVENT) 21 MCG (0.03 %) NASAL SPRAY    2 sprays by Each Nostril route 2 (two) times daily.    MEDROXYPROGESTERONE (DEPO-PROVERA) 150 MG/ML SYRG    BRING PREFILLED SYRINGE TO OFFICE FOR ADMINISTRATION EVERY THREE MONTHS    TIZANIDINE (ZANAFLEX) 4 MG TABLET    TAKE 1 TABLET(4 MG) BY MOUTH EVERY 6 HOURS AS NEEDED FOR PAIN OR SPASMS OR JOINT PAIN       Problem List[1]        Objective:      BP (!) 178/102   Pulse 62   Temp 97.4 °F (36.3 °C) (Oral)   Resp 20   Wt 59.4 kg (130 lb 15.3 oz)   SpO2 99%   BMI 25.58 kg/m²       Physical Exam    Vitals: Blood pressure: 178/102. Hypertensive.  General: No acute distress. Well-developed. Well-nourished.  Eyes: EOMI. Sclerae anicteric.  HENT: Normocephalic. Atraumatic. Nares patent. Moist oral mucosa.  Cardiovascular: Regular rate. Regular rhythm. No murmurs. No rubs. No gallops. Normal S1, S2.  Respiratory: Normal respiratory effort. Clear to auscultation bilaterally. No rales. No rhonchi. No wheezing.  Musculoskeletal: No  obvious deformity.  Extremities: No lower extremity edema.  Neurological: Alert & oriented x3. No slurred speech.  Normal gait.  Psychiatric: Normal mood. Normal affect. Good insight. Good judgment.  Skin: Warm. Dry. No rash.           Assessment and Plan:     Assessment & Plan    - Evaluated elevated BP reading of 178/102 in context of family history of HTN and recent high readings.  - Assessed need for potential antihypertensive medication based on consistent high readings.  - Considered risks of weight gain associated with Depo-Provera in context of potential HTN and reviewed its implications for BP management.    HYPERTENSION:  - Monitored blood pressure at 178/102 during visit (home reading 154/106).  - Evaluated patient's report of fluctuating blood pressure and associated headaches.  - Explained the relationship between salt intake, fluid retention, and blood pressure, including normal ranges and treatment thresholds.  - Advised reducing salt intake and recommended home blood pressure monitoring using a borrowed cuff.  - Instructed patient to contact office to share home readings if obtained.  - Will consider starting antihypertensive medication if blood pressure remains consistently elevated.  - Ordered comprehensive labs including CBC, CMP (electrolytes, kidney and liver function), thyroid function tests, lipid panel, and diabetes screening.    MISCARRIAGE FOLLOW-UP:  - Explained process of menstrual cycle regulation post-miscarriage.  - Ordered HCG levels to confirm appropriate decline post-miscarriage.    STD SCREENING:  - Ordered comprehensive STD screening including hepatitis, HIV, syphilis test, and self-administered swab for gonorrhea, chlamydia, yeast infection, and bacterial vaginosis.    FOLLOW-UP:  - Scheduled follow up in 2 weeks for a blood pressure check with the nurse.         Follow Up:   Follow up for 2 wk nursing BP visit.    Other Orders Placed This Visit:  Orders Placed This Encounter    CBC Auto Differential    Comprehensive Metabolic Panel    Hemoglobin A1C    Lipid Panel    TSH    Hepatitis Panel,  Acute    HIV 1/2 Ag/Ab (4th Gen)    Treponema Pallidium Antibodies IgG, IgM    Vaginosis Screen by DNA Probe    C. trachomatis/N. gonorrhoeae by AMP DNA [facility]; Vagina    HCG, Quantitative         Aden Drew MD        This note is dictated on [email or web address] word recognition program and This note was generated with the assistance of ambient listening technology. Verbal consent was obtained by the patient and accompanying visitor(s) for the recording of patient appointment to facilitate this note. I attest to having reviewed and edited the generated note for accuracy, though some syntax or spelling errors may persist. Please contact the author of this note for any clarification.  .  There are word recognition mistakes that are occasionally missed on review.         [1]   Patient Active Problem List  Diagnosis    Acute pain of right shoulder    Muscle weakness of right upper extremity    Decreased range of motion of right shoulder    Allergic rhinitis    Moderate persistent asthma    Muscle pain    Arthralgia

## 2025-07-02 NOTE — PATIENT INSTRUCTIONS
Normal blood pressure 120/80  >130/80, is high blood pressure  >140/90, will start on a blood pressure medication

## 2025-07-16 ENCOUNTER — CLINICAL SUPPORT (OUTPATIENT)
Dept: PRIMARY CARE CLINIC | Facility: CLINIC | Age: 35
End: 2025-07-16
Payer: MEDICAID

## 2025-07-16 VITALS — SYSTOLIC BLOOD PRESSURE: 120 MMHG | OXYGEN SATURATION: 99 % | HEART RATE: 82 BPM | DIASTOLIC BLOOD PRESSURE: 84 MMHG

## 2025-07-16 DIAGNOSIS — R03.0 ELEVATED BLOOD PRESSURE READING WITHOUT DIAGNOSIS OF HYPERTENSION: Primary | ICD-10-CM

## 2025-07-16 PROCEDURE — 99999 PR PBB SHADOW E&M-EST. PATIENT-LVL II: CPT | Mod: PBBFAC,,,

## 2025-07-16 PROCEDURE — 99212 OFFICE O/P EST SF 10 MIN: CPT | Mod: PBBFAC,PN

## 2025-07-16 NOTE — PROGRESS NOTES
Jennyfer Bobby 35 y.o. female is here today for Blood Pressure check.   History of HTN no.    Review of patient's allergies indicates:   Allergen Reactions    Naproxen Nausea And Vomiting     Creatinine   Date Value Ref Range Status   07/02/2025 0.8 0.5 - 1.4 mg/dL Final     Sodium   Date Value Ref Range Status   07/02/2025 137 136 - 145 mmol/L Final   08/10/2023 139 136 - 145 mmol/L Final     Potassium   Date Value Ref Range Status   07/02/2025 3.9 3.5 - 5.1 mmol/L Final   08/10/2023 3.9 3.5 - 5.1 mmol/L Final   ]  Patient denies taking blood pressure medications on a regular basis at the same time of the day.   Current Medications[1]  Does patient have record of home blood pressure readings no. .   Last dose of blood pressure medication was taken at n/a.  Patient is asymptomatic.   Complains of none.    Vitals:    07/16/25 0841   BP: 120/84   BP Location: Left arm   Patient Position: Sitting   Pulse: 82   SpO2: 99%         Dr. Drew informed of nurse visit.          [1]   Current Outpatient Medications:     acetaminophen (TYLENOL) 500 MG tablet, Take 2 tablets (1,000 mg total) by mouth every 8 (eight) hours as needed for Pain., Disp: 30 tablet, Rfl: 0    albuterol (PROVENTIL/VENTOLIN HFA) 90 mcg/actuation inhaler, Inhale 2 puffs into the lungs every 4 (four) hours as needed for Wheezing or Shortness of Breath. Rescue, Disp: 18 g, Rfl: 5    azelastine (ASTELIN) 137 mcg (0.1 %) nasal spray, 1 spray (137 mcg total) by Nasal route 2 (two) times daily., Disp: 30 mL, Rfl: 2    diclofenac sodium (VOLTAREN) 1 % Gel, Apply 2 g topically 4 (four) times daily., Disp: 100 g, Rfl: 1    diphenhydrAMINE (BENADRYL) 25 mg capsule, Take 1 capsule (25 mg total) by mouth every 6 (six) hours as needed for Itching or Allergies. (Patient not taking: Reported on 7/2/2025), Disp: 30 capsule, Rfl: 0    ibuprofen (ADVIL,MOTRIN) 600 MG tablet, Take 1 tablet (600 mg total) by mouth every 6 (six) hours as needed for Pain (heavy  bleeding (take with food))., Disp: 60 tablet, Rfl: 3    ipratropium (ATROVENT) 21 mcg (0.03 %) nasal spray, 2 sprays by Each Nostril route 2 (two) times daily., Disp: 30 mL, Rfl: 2    medroxyPROGESTERone (DEPO-PROVERA) 150 mg/mL Syrg, BRING PREFILLED SYRINGE TO OFFICE FOR ADMINISTRATION EVERY THREE MONTHS (Patient not taking: Reported on 7/2/2025), Disp: , Rfl:     tiZANidine (ZANAFLEX) 4 MG tablet, TAKE 1 TABLET(4 MG) BY MOUTH EVERY 6 HOURS AS NEEDED FOR PAIN OR SPASMS OR JOINT PAIN (Patient not taking: Reported on 7/2/2025), Disp: 30 tablet, Rfl: 2